# Patient Record
Sex: FEMALE | Race: WHITE | NOT HISPANIC OR LATINO
[De-identification: names, ages, dates, MRNs, and addresses within clinical notes are randomized per-mention and may not be internally consistent; named-entity substitution may affect disease eponyms.]

---

## 2017-06-22 ENCOUNTER — RX RENEWAL (OUTPATIENT)
Age: 47
End: 2017-06-22

## 2017-09-12 ENCOUNTER — LABORATORY RESULT (OUTPATIENT)
Age: 47
End: 2017-09-12

## 2017-09-12 ENCOUNTER — APPOINTMENT (OUTPATIENT)
Dept: INTERNAL MEDICINE | Facility: CLINIC | Age: 47
End: 2017-09-12
Payer: MEDICAID

## 2017-09-12 VITALS
HEIGHT: 60 IN | TEMPERATURE: 98.8 F | HEART RATE: 77 BPM | BODY MASS INDEX: 30.82 KG/M2 | SYSTOLIC BLOOD PRESSURE: 160 MMHG | DIASTOLIC BLOOD PRESSURE: 90 MMHG | WEIGHT: 157 LBS | OXYGEN SATURATION: 97 %

## 2017-09-12 DIAGNOSIS — J30.2 OTHER SEASONAL ALLERGIC RHINITIS: ICD-10-CM

## 2017-09-12 PROCEDURE — 99386 PREV VISIT NEW AGE 40-64: CPT | Mod: 25

## 2017-09-12 PROCEDURE — 36415 COLL VENOUS BLD VENIPUNCTURE: CPT

## 2017-09-17 LAB
25(OH)D3 SERPL-MCNC: 40.2 NG/ML
ALBUMIN SERPL ELPH-MCNC: 4.5 G/DL
ALP BLD-CCNC: 42 U/L
ALT SERPL-CCNC: 13 U/L
ANION GAP SERPL CALC-SCNC: 18 MMOL/L
APPEARANCE: ABNORMAL
AST SERPL-CCNC: 18 U/L
BASOPHILS # BLD AUTO: 0.05 K/UL
BASOPHILS NFR BLD AUTO: 0.8 %
BILIRUB SERPL-MCNC: 0.4 MG/DL
BILIRUBIN URINE: NEGATIVE
BLOOD URINE: NEGATIVE
BUN SERPL-MCNC: 12 MG/DL
CALCIUM SERPL-MCNC: 9.6 MG/DL
CHLORIDE SERPL-SCNC: 98 MMOL/L
CHOLEST SERPL-MCNC: 227 MG/DL
CHOLEST/HDLC SERPL: 4.9 RATIO
CO2 SERPL-SCNC: 22 MMOL/L
COLOR: ABNORMAL
CREAT SERPL-MCNC: 0.97 MG/DL
CREAT SPEC-SCNC: 275 MG/DL
EOSINOPHIL # BLD AUTO: 0.28 K/UL
EOSINOPHIL NFR BLD AUTO: 4.3 %
GLUCOSE QUALITATIVE U: NORMAL MG/DL
GLUCOSE SERPL-MCNC: 156 MG/DL
HBA1C MFR BLD HPLC: 7 %
HCT VFR BLD CALC: 39.6 %
HDLC SERPL-MCNC: 46 MG/DL
HGB BLD-MCNC: 13 G/DL
HIV1+2 AB SPEC QL IA.RAPID: NONREACTIVE
IMM GRANULOCYTES NFR BLD AUTO: 0.3 %
KETONES URINE: NEGATIVE
LDLC SERPL CALC-MCNC: 125 MG/DL
LEUKOCYTE ESTERASE URINE: NEGATIVE
LYMPHOCYTES # BLD AUTO: 1.63 K/UL
LYMPHOCYTES NFR BLD AUTO: 24.9 %
MAN DIFF?: NORMAL
MCHC RBC-ENTMCNC: 30.2 PG
MCHC RBC-ENTMCNC: 32.8 GM/DL
MCV RBC AUTO: 91.9 FL
MICROALBUMIN 24H UR DL<=1MG/L-MCNC: 4.2 MG/DL
MICROALBUMIN/CREAT 24H UR-RTO: 15 MG/G
MONOCYTES # BLD AUTO: 0.42 K/UL
MONOCYTES NFR BLD AUTO: 6.4 %
NEUTROPHILS # BLD AUTO: 4.14 K/UL
NEUTROPHILS NFR BLD AUTO: 63.3 %
NITRITE URINE: NEGATIVE
PH URINE: 5
PLATELET # BLD AUTO: 293 K/UL
POTASSIUM SERPL-SCNC: 4.8 MMOL/L
PROT SERPL-MCNC: 7.6 G/DL
PROTEIN URINE: ABNORMAL MG/DL
RBC # BLD: 4.31 M/UL
RBC # FLD: 12.7 %
SODIUM SERPL-SCNC: 138 MMOL/L
SPECIFIC GRAVITY URINE: 1.03
T4 FREE SERPL-MCNC: 1.4 NG/DL
TRIGL SERPL-MCNC: 278 MG/DL
TSH SERPL-ACNC: 4.59 UIU/ML
UROBILINOGEN URINE: NORMAL MG/DL
WBC # FLD AUTO: 6.54 K/UL

## 2017-09-26 ENCOUNTER — MEDICATION RENEWAL (OUTPATIENT)
Age: 47
End: 2017-09-26

## 2017-09-26 ENCOUNTER — TRANSCRIPTION ENCOUNTER (OUTPATIENT)
Age: 47
End: 2017-09-26

## 2017-10-18 ENCOUNTER — APPOINTMENT (OUTPATIENT)
Dept: CHRONIC DISEASE MANAGEMENT | Facility: CLINIC | Age: 47
End: 2017-10-18

## 2017-11-22 ENCOUNTER — TRANSCRIPTION ENCOUNTER (OUTPATIENT)
Age: 47
End: 2017-11-22

## 2017-11-22 ENCOUNTER — CLINICAL ADVICE (OUTPATIENT)
Age: 47
End: 2017-11-22

## 2017-11-22 DIAGNOSIS — L98.9 DISORDER OF THE SKIN AND SUBCUTANEOUS TISSUE, UNSPECIFIED: ICD-10-CM

## 2017-12-15 ENCOUNTER — CLINICAL ADVICE (OUTPATIENT)
Age: 47
End: 2017-12-15

## 2017-12-18 ENCOUNTER — TRANSCRIPTION ENCOUNTER (OUTPATIENT)
Age: 47
End: 2017-12-18

## 2018-01-05 ENCOUNTER — APPOINTMENT (OUTPATIENT)
Dept: INTERNAL MEDICINE | Facility: CLINIC | Age: 48
End: 2018-01-05
Payer: MEDICAID

## 2018-01-05 VITALS
WEIGHT: 155 LBS | OXYGEN SATURATION: 98 % | HEART RATE: 103 BPM | HEIGHT: 60 IN | DIASTOLIC BLOOD PRESSURE: 80 MMHG | TEMPERATURE: 98 F | SYSTOLIC BLOOD PRESSURE: 130 MMHG | BODY MASS INDEX: 30.43 KG/M2

## 2018-01-05 DIAGNOSIS — J06.9 ACUTE UPPER RESPIRATORY INFECTION, UNSPECIFIED: ICD-10-CM

## 2018-01-05 PROCEDURE — 99213 OFFICE O/P EST LOW 20 MIN: CPT

## 2018-01-05 RX ORDER — DOXYCYCLINE HYCLATE 100 MG/1
100 CAPSULE ORAL
Qty: 20 | Refills: 0 | Status: DISCONTINUED | COMMUNITY
Start: 2017-10-05

## 2018-01-09 LAB
BACTERIA THROAT CULT: ABNORMAL
FLUAV SPEC QL CULT: NEGATIVE
FLUBV AG SPEC QL IA: NEGATIVE
RAPID RVP RESULT: NOT DETECTED

## 2018-01-14 ENCOUNTER — TRANSCRIPTION ENCOUNTER (OUTPATIENT)
Age: 48
End: 2018-01-14

## 2018-01-16 ENCOUNTER — TRANSCRIPTION ENCOUNTER (OUTPATIENT)
Age: 48
End: 2018-01-16

## 2018-01-19 ENCOUNTER — APPOINTMENT (OUTPATIENT)
Dept: INTERNAL MEDICINE | Facility: CLINIC | Age: 48
End: 2018-01-19

## 2018-02-01 ENCOUNTER — MEDICATION RENEWAL (OUTPATIENT)
Age: 48
End: 2018-02-01

## 2018-02-02 ENCOUNTER — APPOINTMENT (OUTPATIENT)
Dept: INTERNAL MEDICINE | Facility: CLINIC | Age: 48
End: 2018-02-02
Payer: MEDICAID

## 2018-02-02 ENCOUNTER — TRANSCRIPTION ENCOUNTER (OUTPATIENT)
Age: 48
End: 2018-02-02

## 2018-02-02 PROCEDURE — G0008: CPT

## 2018-02-02 PROCEDURE — 90686 IIV4 VACC NO PRSV 0.5 ML IM: CPT

## 2018-02-14 ENCOUNTER — TRANSCRIPTION ENCOUNTER (OUTPATIENT)
Age: 48
End: 2018-02-14

## 2018-02-14 ENCOUNTER — MEDICATION RENEWAL (OUTPATIENT)
Age: 48
End: 2018-02-14

## 2018-02-15 ENCOUNTER — TRANSCRIPTION ENCOUNTER (OUTPATIENT)
Age: 48
End: 2018-02-15

## 2018-04-27 ENCOUNTER — LABORATORY RESULT (OUTPATIENT)
Age: 48
End: 2018-04-27

## 2018-04-27 ENCOUNTER — APPOINTMENT (OUTPATIENT)
Dept: INTERNAL MEDICINE | Facility: CLINIC | Age: 48
End: 2018-04-27
Payer: MEDICAID

## 2018-04-27 VITALS
HEIGHT: 60 IN | WEIGHT: 153 LBS | BODY MASS INDEX: 30.04 KG/M2 | SYSTOLIC BLOOD PRESSURE: 124 MMHG | OXYGEN SATURATION: 98 % | DIASTOLIC BLOOD PRESSURE: 72 MMHG | TEMPERATURE: 98.3 F | HEART RATE: 86 BPM

## 2018-04-27 PROCEDURE — 36415 COLL VENOUS BLD VENIPUNCTURE: CPT

## 2018-04-27 PROCEDURE — 99214 OFFICE O/P EST MOD 30 MIN: CPT | Mod: 25

## 2018-04-27 RX ORDER — AZITHROMYCIN 250 MG/1
250 TABLET, FILM COATED ORAL
Qty: 1 | Refills: 0 | Status: COMPLETED | COMMUNITY
Start: 2018-01-05 | End: 2018-04-27

## 2018-05-29 ENCOUNTER — APPOINTMENT (OUTPATIENT)
Dept: DERMATOLOGY | Facility: CLINIC | Age: 48
End: 2018-05-29

## 2018-06-11 ENCOUNTER — MEDICATION RENEWAL (OUTPATIENT)
Age: 48
End: 2018-06-11

## 2018-06-11 ENCOUNTER — TRANSCRIPTION ENCOUNTER (OUTPATIENT)
Age: 48
End: 2018-06-11

## 2018-06-12 ENCOUNTER — MEDICATION RENEWAL (OUTPATIENT)
Age: 48
End: 2018-06-12

## 2018-06-12 ENCOUNTER — TRANSCRIPTION ENCOUNTER (OUTPATIENT)
Age: 48
End: 2018-06-12

## 2018-06-12 LAB
ALBUMIN SERPL ELPH-MCNC: 4.5 G/DL
ALP BLD-CCNC: 51 U/L
ALT SERPL-CCNC: 27 U/L
ANION GAP SERPL CALC-SCNC: 16 MMOL/L
AST SERPL-CCNC: 21 U/L
BILIRUB SERPL-MCNC: 0.3 MG/DL
BUN SERPL-MCNC: 23 MG/DL
CALCIUM SERPL-MCNC: 9.6 MG/DL
CHLORIDE SERPL-SCNC: 101 MMOL/L
CHOLEST SERPL-MCNC: 247 MG/DL
CHOLEST/HDLC SERPL: 4.3 RATIO
CO2 SERPL-SCNC: 20 MMOL/L
CREAT SERPL-MCNC: 0.83 MG/DL
GLUCOSE SERPL-MCNC: 132 MG/DL
HBA1C MFR BLD HPLC: 6.6 %
HDLC SERPL-MCNC: 57 MG/DL
LDLC SERPL CALC-MCNC: 162 MG/DL
POTASSIUM SERPL-SCNC: 4.7 MMOL/L
PROT SERPL-MCNC: 7.4 G/DL
SODIUM SERPL-SCNC: 137 MMOL/L
TRIGL SERPL-MCNC: 139 MG/DL
TSH SERPL-ACNC: 0.2 UIU/ML

## 2018-06-12 RX ORDER — METFORMIN HYDROCHLORIDE 1000 MG/1
1000 TABLET, FILM COATED, EXTENDED RELEASE ORAL
Qty: 90 | Refills: 1 | Status: DISCONTINUED | COMMUNITY
Start: 2018-01-05 | End: 2018-06-12

## 2018-06-25 ENCOUNTER — TRANSCRIPTION ENCOUNTER (OUTPATIENT)
Age: 48
End: 2018-06-25

## 2018-06-26 ENCOUNTER — TRANSCRIPTION ENCOUNTER (OUTPATIENT)
Age: 48
End: 2018-06-26

## 2018-07-31 ENCOUNTER — APPOINTMENT (OUTPATIENT)
Dept: INTERNAL MEDICINE | Facility: CLINIC | Age: 48
End: 2018-07-31
Payer: MEDICAID

## 2018-07-31 VITALS
SYSTOLIC BLOOD PRESSURE: 140 MMHG | TEMPERATURE: 98.3 F | HEART RATE: 80 BPM | DIASTOLIC BLOOD PRESSURE: 70 MMHG | WEIGHT: 166 LBS | OXYGEN SATURATION: 98 % | HEIGHT: 60 IN | BODY MASS INDEX: 32.59 KG/M2

## 2018-07-31 PROCEDURE — 36415 COLL VENOUS BLD VENIPUNCTURE: CPT

## 2018-07-31 PROCEDURE — 99214 OFFICE O/P EST MOD 30 MIN: CPT | Mod: 25

## 2018-07-31 RX ORDER — LEVOTHYROXINE SODIUM 0.14 MG/1
137 TABLET ORAL DAILY
Qty: 30 | Refills: 3 | Status: COMPLETED | COMMUNITY
Start: 2017-09-26 | End: 2018-07-31

## 2018-08-01 ENCOUNTER — TRANSCRIPTION ENCOUNTER (OUTPATIENT)
Age: 48
End: 2018-08-01

## 2018-08-01 LAB
HBA1C MFR BLD HPLC: 6.8 %
T4 FREE SERPL-MCNC: 1.6 NG/DL
TSH SERPL-ACNC: 1.73 UIU/ML

## 2018-08-02 ENCOUNTER — TRANSCRIPTION ENCOUNTER (OUTPATIENT)
Age: 48
End: 2018-08-02

## 2018-08-02 NOTE — HISTORY OF PRESENT ILLNESS
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 Remainder of ROS checked and was negative

## 2018-08-02 NOTE — ASSESSMENT
[FreeTextEntry1] : 47yo F with t2dm here for follo wup\par \par T2dm - cont metformin\par \par hypothyroidism - cont levothyroxine - check levels today\par \par \par Obesity - encouraged healthy eating

## 2018-08-02 NOTE — PHYSICAL EXAM
[No Acute Distress] : no acute distress [Well Nourished] : well nourished [Well Developed] : well developed [Well-Appearing] : well-appearing [Normal Sclera/Conjunctiva] : normal sclera/conjunctiva [PERRL] : pupils equal round and reactive to light [EOMI] : extraocular movements intact [Normal Outer Ear/Nose] : the outer ears and nose were normal in appearance [Normal Oropharynx] : the oropharynx was normal [No JVD] : no jugular venous distention [Supple] : supple [No Respiratory Distress] : no respiratory distress  [Clear to Auscultation] : lungs were clear to auscultation bilaterally [No Accessory Muscle Use] : no accessory muscle use [Normal Rate] : normal rate  [Regular Rhythm] : with a regular rhythm [Normal S1, S2] : normal S1 and S2 [No Murmur] : no murmur heard [Soft] : abdomen soft [Non Tender] : non-tender [Non-distended] : non-distended [Normal Posterior Cervical Nodes] : no posterior cervical lymphadenopathy [Normal Anterior Cervical Nodes] : no anterior cervical lymphadenopathy [No CVA Tenderness] : no CVA  tenderness [No Spinal Tenderness] : no spinal tenderness [No Joint Swelling] : no joint swelling [Grossly Normal Strength/Tone] : grossly normal strength/tone [No Rash] : no rash [Normal Gait] : normal gait [Coordination Grossly Intact] : coordination grossly intact [No Focal Deficits] : no focal deficits [Deep Tendon Reflexes (DTR)] : deep tendon reflexes were 2+ and symmetric [Normal Affect] : the affect was normal [Normal Insight/Judgement] : insight and judgment were intact

## 2018-08-23 ENCOUNTER — TRANSCRIPTION ENCOUNTER (OUTPATIENT)
Age: 48
End: 2018-08-23

## 2018-08-28 ENCOUNTER — TRANSCRIPTION ENCOUNTER (OUTPATIENT)
Age: 48
End: 2018-08-28

## 2018-11-27 ENCOUNTER — APPOINTMENT (OUTPATIENT)
Dept: INTERNAL MEDICINE | Facility: CLINIC | Age: 48
End: 2018-11-27
Payer: MEDICAID

## 2018-11-27 VITALS
WEIGHT: 166 LBS | TEMPERATURE: 97.9 F | HEIGHT: 60 IN | DIASTOLIC BLOOD PRESSURE: 76 MMHG | BODY MASS INDEX: 32.59 KG/M2 | OXYGEN SATURATION: 97 % | HEART RATE: 79 BPM | SYSTOLIC BLOOD PRESSURE: 162 MMHG

## 2018-11-27 PROCEDURE — 90686 IIV4 VACC NO PRSV 0.5 ML IM: CPT

## 2018-11-27 PROCEDURE — G0008: CPT

## 2018-11-27 PROCEDURE — 99214 OFFICE O/P EST MOD 30 MIN: CPT | Mod: 25

## 2018-11-27 NOTE — PHYSICAL EXAM
[No Acute Distress] : no acute distress [Well Nourished] : well nourished [Normal Sclera/Conjunctiva] : normal sclera/conjunctiva [PERRL] : pupils equal round and reactive to light [Normal Outer Ear/Nose] : the outer ears and nose were normal in appearance [Normal Oropharynx] : the oropharynx was normal [Supple] : supple [Clear to Auscultation] : lungs were clear to auscultation bilaterally [Regular Rhythm] : with a regular rhythm [Normal S1, S2] : normal S1 and S2 [No CVA Tenderness] : no CVA  tenderness [No Spinal Tenderness] : no spinal tenderness [No Rash] : no rash

## 2018-11-30 NOTE — HISTORY OF PRESENT ILLNESS
[de-identified] : 49yo F with hx of alcohol misuse here for follow up.\par \par Nov 3rd, 2018 -got pulled over for DUI - spent an night in [facility] - car impounded. went to a court. Still with license. Now she has really cut down on etoh.\par \par has made dietary changes regarding the diabetes. taking the metformin.\par \par no cp, sob, n.v.d. Remainder of ROS reviewed and found to be negative.\par

## 2018-11-30 NOTE — ASSESSMENT
[FreeTextEntry1] : 49yo F with alcohol misuse, diabetes 2 here for followup\par \par Alcohol misuse - with recent DUI - pt has gained insight (more insight) into her drinking problem. Multiple conversation with health coaches in the past. Cont to decline naloxone or antabuse\par \par                        -will cont to reduce wine intake\par \par T2DM - cont metformin \par              blood work in 3 months\par \par \par >50% of this 25 minute visit was spent in face-to-face time counseling patient on management of alcohol misuse.

## 2019-01-02 ENCOUNTER — APPOINTMENT (OUTPATIENT)
Dept: DERMATOLOGY | Facility: CLINIC | Age: 49
End: 2019-01-02
Payer: MEDICAID

## 2019-01-02 VITALS
WEIGHT: 150 LBS | SYSTOLIC BLOOD PRESSURE: 144 MMHG | DIASTOLIC BLOOD PRESSURE: 80 MMHG | HEIGHT: 60 IN | BODY MASS INDEX: 29.45 KG/M2

## 2019-01-02 DIAGNOSIS — L82.1 OTHER SEBORRHEIC KERATOSIS: ICD-10-CM

## 2019-01-02 DIAGNOSIS — D23.9 OTHER BENIGN NEOPLASM OF SKIN, UNSPECIFIED: ICD-10-CM

## 2019-01-02 DIAGNOSIS — L70.9 ACNE, UNSPECIFIED: ICD-10-CM

## 2019-01-02 DIAGNOSIS — L81.4 OTHER MELANIN HYPERPIGMENTATION: ICD-10-CM

## 2019-01-02 PROCEDURE — 99203 OFFICE O/P NEW LOW 30 MIN: CPT

## 2019-02-12 ENCOUNTER — APPOINTMENT (OUTPATIENT)
Dept: OPHTHALMOLOGY | Facility: CLINIC | Age: 49
End: 2019-02-12

## 2019-02-19 ENCOUNTER — TRANSCRIPTION ENCOUNTER (OUTPATIENT)
Age: 49
End: 2019-02-19

## 2019-02-19 ENCOUNTER — CLINICAL ADVICE (OUTPATIENT)
Age: 49
End: 2019-02-19

## 2019-02-25 ENCOUNTER — TRANSCRIPTION ENCOUNTER (OUTPATIENT)
Age: 49
End: 2019-02-25

## 2019-02-28 ENCOUNTER — TRANSCRIPTION ENCOUNTER (OUTPATIENT)
Age: 49
End: 2019-02-28

## 2019-03-01 ENCOUNTER — MEDICATION RENEWAL (OUTPATIENT)
Age: 49
End: 2019-03-01

## 2019-03-01 ENCOUNTER — TRANSCRIPTION ENCOUNTER (OUTPATIENT)
Age: 49
End: 2019-03-01

## 2019-03-04 ENCOUNTER — TRANSCRIPTION ENCOUNTER (OUTPATIENT)
Age: 49
End: 2019-03-04

## 2019-03-04 ENCOUNTER — MEDICATION RENEWAL (OUTPATIENT)
Age: 49
End: 2019-03-04

## 2019-03-18 ENCOUNTER — TRANSCRIPTION ENCOUNTER (OUTPATIENT)
Age: 49
End: 2019-03-18

## 2019-05-18 ENCOUNTER — TRANSCRIPTION ENCOUNTER (OUTPATIENT)
Age: 49
End: 2019-05-18

## 2019-05-18 DIAGNOSIS — M25.539 PAIN IN UNSPECIFIED WRIST: ICD-10-CM

## 2019-06-11 ENCOUNTER — APPOINTMENT (OUTPATIENT)
Dept: ORTHOPEDIC SURGERY | Facility: CLINIC | Age: 49
End: 2019-06-11
Payer: MEDICAID

## 2019-06-11 VITALS — SYSTOLIC BLOOD PRESSURE: 155 MMHG | DIASTOLIC BLOOD PRESSURE: 88 MMHG | HEART RATE: 91 BPM

## 2019-06-11 PROCEDURE — 20600 DRAIN/INJ JOINT/BURSA W/O US: CPT | Mod: LT

## 2019-06-11 PROCEDURE — 73130 X-RAY EXAM OF HAND: CPT | Mod: LT

## 2019-06-11 PROCEDURE — 99203 OFFICE O/P NEW LOW 30 MIN: CPT | Mod: 25

## 2019-06-17 ENCOUNTER — OUTPATIENT (OUTPATIENT)
Dept: OUTPATIENT SERVICES | Facility: HOSPITAL | Age: 49
LOS: 1 days | End: 2019-06-17
Payer: COMMERCIAL

## 2019-06-17 DIAGNOSIS — M19.049 PRIMARY OSTEOARTHRITIS, UNSPECIFIED HAND: ICD-10-CM

## 2019-06-17 PROCEDURE — 97165 OT EVAL LOW COMPLEX 30 MIN: CPT

## 2019-06-17 PROCEDURE — 97110 THERAPEUTIC EXERCISES: CPT

## 2019-07-09 ENCOUNTER — RX RENEWAL (OUTPATIENT)
Age: 49
End: 2019-07-09

## 2019-08-06 ENCOUNTER — APPOINTMENT (OUTPATIENT)
Dept: GASTROENTEROLOGY | Facility: CLINIC | Age: 49
End: 2019-08-06
Payer: MEDICAID

## 2019-08-06 VITALS
HEIGHT: 60 IN | WEIGHT: 170 LBS | OXYGEN SATURATION: 98 % | HEART RATE: 81 BPM | DIASTOLIC BLOOD PRESSURE: 92 MMHG | SYSTOLIC BLOOD PRESSURE: 151 MMHG | BODY MASS INDEX: 33.38 KG/M2

## 2019-08-06 DIAGNOSIS — Z12.11 ENCOUNTER FOR SCREENING FOR MALIGNANT NEOPLASM OF COLON: ICD-10-CM

## 2019-08-06 DIAGNOSIS — Z12.12 ENCOUNTER FOR SCREENING FOR MALIGNANT NEOPLASM OF COLON: ICD-10-CM

## 2019-08-06 PROCEDURE — 99205 OFFICE O/P NEW HI 60 MIN: CPT

## 2019-08-06 RX ORDER — AZITHROMYCIN 250 MG/1
250 TABLET, FILM COATED ORAL
Qty: 1 | Refills: 2 | Status: COMPLETED | COMMUNITY
Start: 2019-02-19 | End: 2019-08-06

## 2019-08-06 NOTE — HISTORY OF PRESENT ILLNESS
[de-identified] : That the patient has been having increasingly problematic upper GI symptoms with reflux, coughing given a PPI by her allergist with relief.  Food sometimes feels like it sticking upon swallowing. She denies nausea vomiting. Her weight has been increasing and typically fluctuates with diet. She is drinking excessive alcohol given the high levels of stress. She is moving her bowels more early but has a history of constipation is due for screening colonoscopy.

## 2019-08-06 NOTE — ASSESSMENT
[FreeTextEntry1] : That my impression is that of reflux into patient who is overweight and currently not caring for herself well. I recommended decrease alcohol intake.I have spent a great deal of time discussing the role of daily exercise with the patient. We discussed lifestyle modification and the merits of brief, exertional efforts. I have discussed nutrition in great detail including consuming vegetable fibers on a daily basis.  We have also reviewed the benefits of soluble fiber supplementation, including (but not limited to), favorable effects on lipid profile, weight control, decreasing the risk of cardiovascular disease and the salutary effects on colonic microbiota.\par \par The patient is due for screening colonoscopy.\par \par I have asked the patient to schedule both an upper endoscopy and a colonoscopy in the near future. I have reviewed the risks benefits and alternatives and provide the patient literature to read.  I have emphasized the need to have a good clean out including adequate fluid intake and avoiding seeds for one week prior to the procedure.

## 2019-08-06 NOTE — PHYSICAL EXAM
[General Appearance - Alert] : alert [General Appearance - In No Acute Distress] : in no acute distress [PERRL With Normal Accommodation] : pupils were equal in size, round, and reactive to light [Sclera] : the sclera and conjunctiva were normal [Outer Ear] : the ears and nose were normal in appearance [Extraocular Movements] : extraocular movements were intact [Oropharynx] : the oropharynx was normal [Neck Appearance] : the appearance of the neck was normal [Neck Cervical Mass (___cm)] : no neck mass was observed [Jugular Venous Distention Increased] : there was no jugular-venous distention [Thyroid Diffuse Enlargement] : the thyroid was not enlarged [Thyroid Nodule] : there were no palpable thyroid nodules [Auscultation Breath Sounds / Voice Sounds] : lungs were clear to auscultation bilaterally [Heart Sounds] : normal S1 and S2 [Heart Rate And Rhythm] : heart rate was normal and rhythm regular [Heart Sounds Gallop] : no gallops [Murmurs] : no murmurs [Heart Sounds Pericardial Friction Rub] : no pericardial rub [Edema] : there was no peripheral edema [Bowel Sounds] : normal bowel sounds [Abdomen Soft] : soft [Abdomen Tenderness] : non-tender [Abdomen Mass (___ Cm)] : no abdominal mass palpated [Cervical Lymph Nodes Enlarged Posterior Bilaterally] : posterior cervical [Cervical Lymph Nodes Enlarged Anterior Bilaterally] : anterior cervical [No CVA Tenderness] : no ~M costovertebral angle tenderness [No Spinal Tenderness] : no spinal tenderness [Abnormal Walk] : normal gait [Nail Clubbing] : no clubbing  or cyanosis of the fingernails [Musculoskeletal - Swelling] : no joint swelling seen [Motor Tone] : muscle strength and tone were normal [Skin Color & Pigmentation] : normal skin color and pigmentation [Skin Turgor] : normal skin turgor [] : no rash [No Focal Deficits] : no focal deficits [Oriented To Time, Place, And Person] : oriented to person, place, and time [Impaired Insight] : insight and judgment were intact [Affect] : the affect was normal

## 2019-08-06 NOTE — REVIEW OF SYSTEMS
[Feeling Poorly] : feeling poorly [Anxiety] : anxiety [As Noted in HPI] : as noted in HPI [Depression] : depression [Negative] : Heme/Lymph

## 2019-08-06 NOTE — REASON FOR VISIT
Patient presents with suspected pharyngeal dysphagia characterized by mildly reduced hyolaryngeal elevation upon palpation, intermittent repeat swallows suggestive of pharyngeal retention with c/o same with chewables, and s/s of laryngeal penetration and/or aspiration with thin liquids. [Consultation] : a consultation visit

## 2019-08-27 ENCOUNTER — MEDICATION RENEWAL (OUTPATIENT)
Age: 49
End: 2019-08-27

## 2019-08-27 ENCOUNTER — TRANSCRIPTION ENCOUNTER (OUTPATIENT)
Age: 49
End: 2019-08-27

## 2019-08-29 ENCOUNTER — TRANSCRIPTION ENCOUNTER (OUTPATIENT)
Age: 49
End: 2019-08-29

## 2019-08-30 ENCOUNTER — OUTPATIENT (OUTPATIENT)
Dept: OUTPATIENT SERVICES | Facility: HOSPITAL | Age: 49
LOS: 1 days | End: 2019-08-30
Payer: COMMERCIAL

## 2019-08-30 ENCOUNTER — APPOINTMENT (OUTPATIENT)
Dept: GASTROENTEROLOGY | Facility: HOSPITAL | Age: 49
End: 2019-08-30

## 2019-08-30 ENCOUNTER — RESULT REVIEW (OUTPATIENT)
Age: 49
End: 2019-08-30

## 2019-08-30 DIAGNOSIS — K21.9 GASTRO-ESOPHAGEAL REFLUX DISEASE WITHOUT ESOPHAGITIS: ICD-10-CM

## 2019-08-30 DIAGNOSIS — Z12.11 ENCOUNTER FOR SCREENING FOR MALIGNANT NEOPLASM OF COLON: ICD-10-CM

## 2019-08-30 LAB — HCG UR QL: NEGATIVE — SIGNIFICANT CHANGE UP

## 2019-08-30 PROCEDURE — 88312 SPECIAL STAINS GROUP 1: CPT | Mod: 26

## 2019-08-30 PROCEDURE — 88312 SPECIAL STAINS GROUP 1: CPT

## 2019-08-30 PROCEDURE — 88313 SPECIAL STAINS GROUP 2: CPT | Mod: 26

## 2019-08-30 PROCEDURE — 45385 COLONOSCOPY W/LESION REMOVAL: CPT | Mod: PT

## 2019-08-30 PROCEDURE — 88313 SPECIAL STAINS GROUP 2: CPT

## 2019-08-30 PROCEDURE — 43239 EGD BIOPSY SINGLE/MULTIPLE: CPT | Mod: 59

## 2019-08-30 PROCEDURE — 81025 URINE PREGNANCY TEST: CPT

## 2019-08-30 PROCEDURE — 88305 TISSUE EXAM BY PATHOLOGIST: CPT | Mod: 26

## 2019-08-30 PROCEDURE — 88305 TISSUE EXAM BY PATHOLOGIST: CPT

## 2019-08-30 PROCEDURE — C1889: CPT

## 2019-08-30 PROCEDURE — 43239 EGD BIOPSY SINGLE/MULTIPLE: CPT

## 2019-08-30 PROCEDURE — 45385 COLONOSCOPY W/LESION REMOVAL: CPT | Mod: 33

## 2019-08-30 PROCEDURE — 82962 GLUCOSE BLOOD TEST: CPT

## 2019-09-03 LAB — SURGICAL PATHOLOGY STUDY: SIGNIFICANT CHANGE UP

## 2019-09-09 ENCOUNTER — TRANSCRIPTION ENCOUNTER (OUTPATIENT)
Age: 49
End: 2019-09-09

## 2019-09-09 ENCOUNTER — RX CHANGE (OUTPATIENT)
Age: 49
End: 2019-09-09

## 2019-09-12 ENCOUNTER — TRANSCRIPTION ENCOUNTER (OUTPATIENT)
Age: 49
End: 2019-09-12

## 2019-09-27 ENCOUNTER — APPOINTMENT (OUTPATIENT)
Dept: INTERNAL MEDICINE | Facility: CLINIC | Age: 49
End: 2019-09-27
Payer: MEDICAID

## 2019-09-27 VITALS
TEMPERATURE: 98.5 F | WEIGHT: 161 LBS | DIASTOLIC BLOOD PRESSURE: 86 MMHG | SYSTOLIC BLOOD PRESSURE: 154 MMHG | BODY MASS INDEX: 31.61 KG/M2 | HEIGHT: 60 IN | HEART RATE: 88 BPM | OXYGEN SATURATION: 98 %

## 2019-09-27 DIAGNOSIS — D21.9 BENIGN NEOPLASM OF CONNECTIVE AND OTHER SOFT TISSUE, UNSPECIFIED: ICD-10-CM

## 2019-09-27 PROCEDURE — 99214 OFFICE O/P EST MOD 30 MIN: CPT | Mod: 25

## 2019-09-27 PROCEDURE — 36415 COLL VENOUS BLD VENIPUNCTURE: CPT

## 2019-09-27 RX ORDER — TRIAMCINOLONE ACETONIDE 55 UG/1
55 SPRAY, METERED NASAL DAILY
Qty: 1 | Refills: 0 | Status: ACTIVE | COMMUNITY
Start: 2019-09-27 | End: 1900-01-01

## 2019-09-27 RX ORDER — SODIUM SULFATE, POTASSIUM SULFATE, MAGNESIUM SULFATE 17.5; 3.13; 1.6 G/ML; G/ML; G/ML
17.5-3.13-1.6 SOLUTION, CONCENTRATE ORAL
Qty: 1 | Refills: 0 | Status: COMPLETED | COMMUNITY
Start: 2019-08-06 | End: 2019-09-27

## 2019-09-27 RX ORDER — MONTELUKAST 10 MG/1
10 TABLET, FILM COATED ORAL DAILY
Qty: 90 | Refills: 1 | Status: ACTIVE | COMMUNITY
Start: 2019-09-27 | End: 1900-01-01

## 2019-09-27 RX ORDER — RANITIDINE 150 MG/1
150 TABLET ORAL
Qty: 270 | Refills: 3 | Status: COMPLETED | COMMUNITY
Start: 2019-09-09 | End: 2019-09-27

## 2019-09-30 NOTE — PHYSICAL EXAM
[EOMI] : extraocular movements intact [PERRL] : pupils equal round and reactive to light [Normal Outer Ear/Nose] : the outer ears and nose were normal in appearance [Normal Oropharynx] : the oropharynx was normal [No Lymphadenopathy] : no lymphadenopathy [No Respiratory Distress] : no respiratory distress  [No Accessory Muscle Use] : no accessory muscle use [Normal S1, S2] : normal S1 and S2 [Regular Rhythm] : with a regular rhythm [Soft] : abdomen soft [Normal Anterior Cervical Nodes] : no anterior cervical lymphadenopathy [Non Tender] : non-tender [Normal Affect] : the affect was normal [Normal Insight/Judgement] : insight and judgment were intact [de-identified] : adult F, new hair cut

## 2019-09-30 NOTE — HISTORY OF PRESENT ILLNESS
[FreeTextEntry1] : T2DM [de-identified] : 50yo F with pMH of alcohol overuse and T2DM here for follow up\par \par alcohol - cont to struggle with drinking but taking less; \par \par T2DM - takes medications daily, no side effects\par \par Remainder of ROS reviewed and found to be negative.\par

## 2019-09-30 NOTE — ASSESSMENT
[FreeTextEntry1] : 50yo F w t2dm here for follow up.\par \par T2DM cont metformin - check A1c today\par \par fibroids - follows w gyn\par \par GERD - s/p EGD\par \par Colonic polyp - s/p colo - return in 3 years\par \par >50% of this 25 minute visit was spent in face-to-face time counseling patient on management of chronic conditions.\par

## 2019-10-07 ENCOUNTER — OUTPATIENT (OUTPATIENT)
Dept: OUTPATIENT SERVICES | Facility: HOSPITAL | Age: 49
LOS: 1 days | End: 2019-10-07
Payer: COMMERCIAL

## 2019-10-07 ENCOUNTER — APPOINTMENT (OUTPATIENT)
Dept: MAMMOGRAPHY | Facility: HOSPITAL | Age: 49
End: 2019-10-07
Payer: MEDICAID

## 2019-10-07 DIAGNOSIS — Z00.8 ENCOUNTER FOR OTHER GENERAL EXAMINATION: ICD-10-CM

## 2019-10-07 PROCEDURE — 77067 SCR MAMMO BI INCL CAD: CPT | Mod: 26

## 2019-10-07 PROCEDURE — 77063 BREAST TOMOSYNTHESIS BI: CPT | Mod: 26

## 2019-10-07 PROCEDURE — 77067 SCR MAMMO BI INCL CAD: CPT

## 2019-10-07 PROCEDURE — 77063 BREAST TOMOSYNTHESIS BI: CPT

## 2019-10-09 ENCOUNTER — TRANSCRIPTION ENCOUNTER (OUTPATIENT)
Age: 49
End: 2019-10-09

## 2019-10-09 LAB
25(OH)D3 SERPL-MCNC: 42.7 NG/ML
ALBUMIN SERPL ELPH-MCNC: 4.8 G/DL
ALP BLD-CCNC: 57 U/L
ALT SERPL-CCNC: 21 U/L
ANION GAP SERPL CALC-SCNC: 14 MMOL/L
AST SERPL-CCNC: 19 U/L
BASOPHILS # BLD AUTO: 0.06 K/UL
BASOPHILS NFR BLD AUTO: 0.9 %
BILIRUB SERPL-MCNC: 0.2 MG/DL
BUN SERPL-MCNC: 15 MG/DL
CALCIUM SERPL-MCNC: 9.7 MG/DL
CHLORIDE SERPL-SCNC: 99 MMOL/L
CHOLEST SERPL-MCNC: 259 MG/DL
CHOLEST/HDLC SERPL: 5.5 RATIO
CO2 SERPL-SCNC: 23 MMOL/L
CREAT SERPL-MCNC: 0.72 MG/DL
CREAT SPEC-SCNC: 105 MG/DL
EOSINOPHIL # BLD AUTO: 0.14 K/UL
EOSINOPHIL NFR BLD AUTO: 2.2 %
ESTIMATED AVERAGE GLUCOSE: 180 MG/DL
GLUCOSE SERPL-MCNC: 120 MG/DL
HBA1C MFR BLD HPLC: 7.9 %
HCT VFR BLD CALC: 38.1 %
HDLC SERPL-MCNC: 47 MG/DL
HGB BLD-MCNC: 11.4 G/DL
IMM GRANULOCYTES NFR BLD AUTO: 0.8 %
LDLC SERPL CALC-MCNC: 163 MG/DL
LYMPHOCYTES # BLD AUTO: 1.95 K/UL
LYMPHOCYTES NFR BLD AUTO: 30 %
MAN DIFF?: NORMAL
MCHC RBC-ENTMCNC: 27.9 PG
MCHC RBC-ENTMCNC: 29.9 GM/DL
MCV RBC AUTO: 93.2 FL
MICROALBUMIN 24H UR DL<=1MG/L-MCNC: 1.2 MG/DL
MICROALBUMIN/CREAT 24H UR-RTO: 11 MG/G
MONOCYTES # BLD AUTO: 0.74 K/UL
MONOCYTES NFR BLD AUTO: 11.4 %
NEUTROPHILS # BLD AUTO: 3.57 K/UL
NEUTROPHILS NFR BLD AUTO: 54.7 %
PLATELET # BLD AUTO: 348 K/UL
POTASSIUM SERPL-SCNC: 4.8 MMOL/L
PROT SERPL-MCNC: 7.5 G/DL
RBC # BLD: 4.09 M/UL
RBC # FLD: 12.9 %
SODIUM SERPL-SCNC: 136 MMOL/L
TRIGL SERPL-MCNC: 247 MG/DL
TSH SERPL-ACNC: 1.76 UIU/ML
WBC # FLD AUTO: 6.51 K/UL

## 2019-10-11 ENCOUNTER — TRANSCRIPTION ENCOUNTER (OUTPATIENT)
Age: 49
End: 2019-10-11

## 2019-10-22 ENCOUNTER — APPOINTMENT (OUTPATIENT)
Dept: OBGYN | Facility: CLINIC | Age: 49
End: 2019-10-22
Payer: MEDICAID

## 2019-10-22 VITALS
DIASTOLIC BLOOD PRESSURE: 76 MMHG | HEIGHT: 60 IN | OXYGEN SATURATION: 98 % | HEART RATE: 83 BPM | BODY MASS INDEX: 31.02 KG/M2 | SYSTOLIC BLOOD PRESSURE: 122 MMHG | WEIGHT: 158 LBS

## 2019-10-22 DIAGNOSIS — Z80.7 FAMILY HISTORY OF OTHER MALIGNANT NEOPLASMS OF LYMPHOID, HEMATOPOIETIC AND RELATED TISSUES: ICD-10-CM

## 2019-10-22 PROCEDURE — 99386 PREV VISIT NEW AGE 40-64: CPT

## 2019-10-22 RX ORDER — MELOXICAM 15 MG/1
15 TABLET ORAL
Qty: 30 | Refills: 0 | Status: DISCONTINUED | COMMUNITY
Start: 2019-06-11 | End: 2019-10-22

## 2019-10-22 NOTE — CHIEF COMPLAINT
[FreeTextEntry1] : P0\par single\par provides  for sister's 3 kids\par [place] pt--does not take ins. H/o myomata [Annual Visit] : annual visit

## 2019-10-22 NOTE — HISTORY OF PRESENT ILLNESS
[Last Mammogram ___] : Last Mammogram was [unfilled] [Good] : being in good health [Last Colonoscopy ___] : Last colonoscopy [unfilled] [Last Pap ___] : Last cervical pap smear was [unfilled] [Perimenopausal] : is perimenopausal [Sexually Active] : is not sexually active [HPV Vaccine NA Due to Age] : HPV vaccine not available to patient due to age

## 2019-10-28 LAB
CYTOLOGY CVX/VAG DOC THIN PREP: ABNORMAL
HPV HIGH+LOW RISK DNA PNL CVX: DETECTED

## 2019-10-29 ENCOUNTER — APPOINTMENT (OUTPATIENT)
Dept: ULTRASOUND IMAGING | Facility: HOSPITAL | Age: 49
End: 2019-10-29
Payer: MEDICAID

## 2019-11-14 ENCOUNTER — MEDICATION RENEWAL (OUTPATIENT)
Age: 49
End: 2019-11-14

## 2019-11-19 ENCOUNTER — APPOINTMENT (OUTPATIENT)
Dept: ULTRASOUND IMAGING | Facility: HOSPITAL | Age: 49
End: 2019-11-19
Payer: MEDICAID

## 2019-11-19 ENCOUNTER — OUTPATIENT (OUTPATIENT)
Dept: OUTPATIENT SERVICES | Facility: HOSPITAL | Age: 49
LOS: 1 days | End: 2019-11-19
Payer: COMMERCIAL

## 2019-11-19 DIAGNOSIS — D21.9 BENIGN NEOPLASM OF CONNECTIVE AND OTHER SOFT TISSUE, UNSPECIFIED: ICD-10-CM

## 2019-11-19 PROCEDURE — 76830 TRANSVAGINAL US NON-OB: CPT

## 2019-11-19 PROCEDURE — 76830 TRANSVAGINAL US NON-OB: CPT | Mod: 26

## 2019-11-19 PROCEDURE — 76856 US EXAM PELVIC COMPLETE: CPT

## 2019-11-19 PROCEDURE — 76856 US EXAM PELVIC COMPLETE: CPT | Mod: 26

## 2019-11-26 ENCOUNTER — APPOINTMENT (OUTPATIENT)
Dept: INTERNAL MEDICINE | Facility: CLINIC | Age: 49
End: 2019-11-26

## 2019-12-11 ENCOUNTER — APPOINTMENT (OUTPATIENT)
Dept: GASTROENTEROLOGY | Facility: CLINIC | Age: 49
End: 2019-12-11

## 2020-01-08 ENCOUNTER — RX RENEWAL (OUTPATIENT)
Age: 50
End: 2020-01-08

## 2020-01-16 ENCOUNTER — APPOINTMENT (OUTPATIENT)
Dept: ORTHOPEDIC SURGERY | Facility: CLINIC | Age: 50
End: 2020-01-16
Payer: MEDICAID

## 2020-01-16 VITALS
DIASTOLIC BLOOD PRESSURE: 78 MMHG | HEIGHT: 60 IN | WEIGHT: 150 LBS | SYSTOLIC BLOOD PRESSURE: 138 MMHG | BODY MASS INDEX: 29.45 KG/M2 | HEART RATE: 73 BPM

## 2020-01-16 PROCEDURE — 99214 OFFICE O/P EST MOD 30 MIN: CPT | Mod: 25

## 2020-01-16 PROCEDURE — 20600 DRAIN/INJ JOINT/BURSA W/O US: CPT | Mod: LT

## 2020-01-28 ENCOUNTER — APPOINTMENT (OUTPATIENT)
Dept: INTERNAL MEDICINE | Facility: CLINIC | Age: 50
End: 2020-01-28
Payer: MEDICAID

## 2020-01-28 VITALS
HEIGHT: 60 IN | BODY MASS INDEX: 30.82 KG/M2 | DIASTOLIC BLOOD PRESSURE: 66 MMHG | HEART RATE: 82 BPM | SYSTOLIC BLOOD PRESSURE: 140 MMHG | TEMPERATURE: 98.4 F | WEIGHT: 157 LBS | OXYGEN SATURATION: 98 %

## 2020-01-28 PROCEDURE — 99214 OFFICE O/P EST MOD 30 MIN: CPT

## 2020-01-28 PROCEDURE — 83036 HEMOGLOBIN GLYCOSYLATED A1C: CPT | Mod: QW

## 2020-01-30 NOTE — ASSESSMENT
[FreeTextEntry1] : 50yo F here for folo up\par \par A1c is decreased but still in T2DM range - advised cont the metformin - she is not ready to come off th emedication though she wishes to \par          advised to make appt with [facility]\par \par heavy alcohol consumption - has been abstinent since Oct 2019 - pt encouraged to go one day at a time\par                     has declined naloxone in the past\par \par Hypothyroidism - cont meds

## 2020-01-30 NOTE — PHYSICAL EXAM
[EOMI] : extraocular movements intact [PERRL] : pupils equal round and reactive to light [No Respiratory Distress] : no respiratory distress  [Normal Oropharynx] : the oropharynx was normal [No Accessory Muscle Use] : no accessory muscle use [Normal Rate] : normal rate  [Regular Rhythm] : with a regular rhythm [Normal S1, S2] : normal S1 and S2 [No Rash] : no rash [Normal Affect] : the affect was normal [Normal Insight/Judgement] : insight and judgment were intact [de-identified] : appears more alert, refreshed

## 2020-01-30 NOTE — HISTORY OF PRESENT ILLNESS
[FreeTextEntry1] : T2DM\par alcohol overuse [de-identified] : 48yo F with T2DM and htn here for follow up\par \par # Lifestyle - trying to eat more veggies, was constipated; curious about A1c bc she has mde sig dietary changes\par \par # alcohol - since October 2019 - drinking less\par \par #menses -becoming erratic - last 4 -5 days - first few days are excessive-\par \par Remainder of ROS reviewed and found to be negative.\par

## 2020-02-03 LAB — HBA1C MFR BLD HPLC: 7.5

## 2020-02-04 ENCOUNTER — APPOINTMENT (OUTPATIENT)
Dept: CHRONIC DISEASE MANAGEMENT | Facility: CLINIC | Age: 50
End: 2020-02-04
Payer: MEDICAID

## 2020-02-04 VITALS — HEIGHT: 60 IN | WEIGHT: 156 LBS | BODY MASS INDEX: 30.63 KG/M2

## 2020-02-04 PROCEDURE — G0108 DIAB MANAGE TRN  PER INDIV: CPT

## 2020-02-21 ENCOUNTER — APPOINTMENT (OUTPATIENT)
Dept: ORTHOPEDIC SURGERY | Facility: CLINIC | Age: 50
End: 2020-02-21
Payer: MEDICAID

## 2020-02-21 VITALS
WEIGHT: 156 LBS | HEIGHT: 60 IN | DIASTOLIC BLOOD PRESSURE: 78 MMHG | BODY MASS INDEX: 30.63 KG/M2 | HEART RATE: 81 BPM | SYSTOLIC BLOOD PRESSURE: 129 MMHG

## 2020-02-21 DIAGNOSIS — M75.81 OTHER SHOULDER LESIONS, RIGHT SHOULDER: ICD-10-CM

## 2020-02-21 PROCEDURE — 73030 X-RAY EXAM OF SHOULDER: CPT | Mod: RT

## 2020-02-21 PROCEDURE — 99214 OFFICE O/P EST MOD 30 MIN: CPT

## 2020-02-27 ENCOUNTER — TRANSCRIPTION ENCOUNTER (OUTPATIENT)
Age: 50
End: 2020-02-27

## 2020-03-03 ENCOUNTER — APPOINTMENT (OUTPATIENT)
Dept: CHRONIC DISEASE MANAGEMENT | Facility: CLINIC | Age: 50
End: 2020-03-03

## 2020-04-07 ENCOUNTER — APPOINTMENT (OUTPATIENT)
Dept: OPHTHALMOLOGY | Facility: CLINIC | Age: 50
End: 2020-04-07

## 2020-04-18 ENCOUNTER — TRANSCRIPTION ENCOUNTER (OUTPATIENT)
Age: 50
End: 2020-04-18

## 2020-05-04 ENCOUNTER — RX RENEWAL (OUTPATIENT)
Age: 50
End: 2020-05-04

## 2020-05-26 ENCOUNTER — NON-APPOINTMENT (OUTPATIENT)
Age: 50
End: 2020-05-26

## 2020-05-27 ENCOUNTER — TRANSCRIPTION ENCOUNTER (OUTPATIENT)
Age: 50
End: 2020-05-27

## 2020-06-08 ENCOUNTER — TRANSCRIPTION ENCOUNTER (OUTPATIENT)
Age: 50
End: 2020-06-08

## 2020-06-10 ENCOUNTER — APPOINTMENT (OUTPATIENT)
Dept: INTERNAL MEDICINE | Facility: CLINIC | Age: 50
End: 2020-06-10
Payer: MEDICAID

## 2020-06-10 PROCEDURE — 99215 OFFICE O/P EST HI 40 MIN: CPT | Mod: 95

## 2020-06-14 NOTE — DATA REVIEWED
[FreeTextEntry1] : Administered AUDIT and eval with CIWA during visit\par Full AUDIT score: 25\par CIWA - 1

## 2020-06-14 NOTE — HISTORY OF PRESENT ILLNESS
[Home] : at home, [unfilled] , at the time of the visit. [Medical Office: ([facility])___] : at the medical office located in  [Verbal consent obtained from patient] : the patient, [unfilled] [FreeTextEntry1] : "I want to be heathy again"\par To lose weight, to get my sugar under control.  [de-identified] : Has struggled on and off with ETOH for a long time. "I love to drink, but only white wine  -which is bad for my sugar"..\par Reports that once she buys the bottle, its hard to stop till the bottle is empty. Had one DUI in the past - first offense but she did have to pay a substantial penalty and had an ignition interlock breathalyzer mounted on her car for some time. \par Not long ago she stopped drinking completely for 2 months - the first few days had HA, myalgias, sweats, nausea and diarrhea. The HA lasted for 2 weeks and she was cranky for a few weeks.  Then she found she felt much better - like she had turned a corner. \par \par then the stress of the pandemic started getting to her. Had increased anxiety - she started drinking a bit - "just one of the small bottles daily'.  Also recently increased fluoxetine to 60 mg daily from 40 due to the increased anxiety.  Lately she has been drinking a large bottle (~ 10 SD) daily. Feels like it is out of control, a feeling she doesn't like.  she has tried hard to cut back without success and feels she needs help at this point to be successful. \par Her Goal: full abstinence. \par reports her last drink was 3 days ago. She is not having any withdrawal sx at this time. \par \par

## 2020-06-14 NOTE — ASSESSMENT
[FreeTextEntry1] : AUD - with AUDIT score of 25 and with readiness to work towards full abstinence. \par Reviewed options in detail  - both medication and BH interventions.  Explained many people often find a combination of both medication and BH support to be beneficial.  \par She is very interested in beginning medication at this time.  She does not feel comfortable with AA - and although she does not reject peer support or individual counseling outright - would like to start medication now and possibly add other options if needed. \par \par Discussed risk v benefit of Naltrexone and how it works.  Reviewed chart - had labs done in Oct 2019 and baseline LFT's in normal range. Advised ideal to take Naltrexone ~ 2 hours before she usually begins drinking. She states that for her, the bad time is after dinner - once everything is done. She will usually sit down in front of the TV and that’s when she would drink. \par So she will take medication couple of hours prior. Begin with 1/2 pill for first 4 days - then if tolerating without complication, increase to full pill daily. \par Also discussed strategies for when she has cravings - she has several ideas. \par Short interval f/u - would like her to begin [facility] as well \par Also discussed strategies to employ when

## 2020-06-14 NOTE — PHYSICAL EXAM
[No Acute Distress] : no acute distress [Normal Sclera/Conjunctiva] : normal sclera/conjunctiva [EOMI] : extraocular movements intact [Normal Outer Ear/Nose] : the outer ears and nose were normal in appearance [No Respiratory Distress] : no respiratory distress  [No Accessory Muscle Use] : no accessory muscle use [Grossly Normal Strength/Tone] : grossly normal strength/tone [Coordination Grossly Intact] : coordination grossly intact [No Focal Deficits] : no focal deficits [Normal Affect] : the affect was normal [de-identified] : no visible tremor

## 2020-06-15 ENCOUNTER — TRANSCRIPTION ENCOUNTER (OUTPATIENT)
Age: 50
End: 2020-06-15

## 2020-06-16 ENCOUNTER — APPOINTMENT (OUTPATIENT)
Dept: INTERNAL MEDICINE | Facility: CLINIC | Age: 50
End: 2020-06-16
Payer: MEDICAID

## 2020-06-16 ENCOUNTER — TRANSCRIPTION ENCOUNTER (OUTPATIENT)
Age: 50
End: 2020-06-16

## 2020-06-16 PROCEDURE — 99443: CPT

## 2020-06-24 ENCOUNTER — APPOINTMENT (OUTPATIENT)
Dept: INTERNAL MEDICINE | Facility: CLINIC | Age: 50
End: 2020-06-24
Payer: MEDICAID

## 2020-06-24 PROCEDURE — 99214 OFFICE O/P EST MOD 30 MIN: CPT | Mod: 95

## 2020-06-25 NOTE — HISTORY OF PRESENT ILLNESS
[de-identified] : From last visit (6/16/20):\par This past Saturday, 5-6 days after d/c of all ETOH (drinking ~ 10 SD daily), and 3 days after beginning naltrexone, developed sx of nausea, abdominal discomfort/cramping, diarrhea, hand shaking/tremor, anxiety, sweats, and HA. Was severe on Saturday and Sunday, then yesterday somewhat  better and today even better than yesterday.  Initially, she was not sure if was related to the naltrexone or withdrawal so she stopped the naltrexone after 4th dose (had no problem with it the first 3 days).  In the past, she has had very similar sx which she recognized as withdrawal, however last time it started much sooner, within 1-2 days of when she stopped drinking, not 5-6. Yesterday, was still having nausea and HA but most of the other sx had resolved.  She slept a lot as she was very tired from the prior 2 days. She has not resumed drinking so last ETOH was June 7th, 9 days ago. \par During these last few days, her sister and [place] of friends were very supportive which helped a lot with her anxiety and determination. \par She also reports noticing that while she was taking the naltrexone, it seemed to have reduced her appetite and she was eating less (which she thinks is a good thing). She also reports that she has been taking Metamucil for constipation which she thinks also happened last time she stopped drinking. \par Is taking MVI but admits she forgets to take it most days. \par Her friends have been working with her the past few days on a 'Tool-box" to use to help her de-stress. \par Things like walk her dog, reach out to her friends, etc when she hits moments of distress.  Yesterday, she spent a couple of hours in the backyard brushing her dog.  He loved it, was licking her the whole time, which is good for her spirits as well. \par CIWA administered - score 6-7\par \par I/P: s/p delayed ETOH withdrawal over the week-end.  \par CIWA score today 6 - no need for medical detox at this time \par Restart Naltrexone (tolerated without complication for 3 days before withdrawal sx started)\par Call imed any side effects or new sx  \par Be sure to take multivit daily and adding thiamine\par Referred to [facility] for BH support - she also has been developing a "tool box" based on our prior visit and help from her friends - strategies to de-stress and distract at times of craving\par SBIRT Hot line info given and advised to use as needed\par f/u for [facility] visit in one week.\par \par Today: \par Ms Arboleda reports she has been doing well.  All withdrawal sx resolved within a day of our last visit. Only thing she has noticed is that she is a bit more irritable than usual. not all the time and not with everyone but she thinks her threshold for getting cranky and impatient is a bit lower than usual. Says she recently increased her fluoxetine from 40 - 60 mg daily (about 2 weeks ago) - she does not really feel the difference yet. \par Tolerating naltrexone without side effects - has been taking daily, usually takes right after dinner, at ~ 6-6:30. \par Yesterday was the first day of the full 50 mg pill.  no ill effects from going to full dose. She definitely has noticed a difference since starting naltrexone - has significantly lowered her urge to drink. She also finds she is snacking less. \par Last week-end, she was at a family gathering where everyone was drinking. Says she was able to enjoy herself, stuck to flavored seltzer, and did not drink any alcohol. Family has been very supportive as have her friends. \par RE [facility] - she has not yet checked it out. She says when she gets irritable, or thinks it is a risky time, she has been using her 'tool box' - for example, she has been walking her dog with a group of friends every night at the time she would normally sit down in front of the TV and start drinking..

## 2020-06-25 NOTE — PHYSICAL EXAM
[No Acute Distress] : no acute distress [Well-Appearing] : well-appearing [Normal Sclera/Conjunctiva] : normal sclera/conjunctiva [EOMI] : extraocular movements intact [No Respiratory Distress] : no respiratory distress  [No Accessory Muscle Use] : no accessory muscle use [No Focal Deficits] : no focal deficits [Normal Affect] : the affect was normal

## 2020-07-01 ENCOUNTER — APPOINTMENT (OUTPATIENT)
Dept: INTERNAL MEDICINE | Facility: CLINIC | Age: 50
End: 2020-07-01
Payer: MEDICAID

## 2020-07-01 PROCEDURE — 99214 OFFICE O/P EST MOD 30 MIN: CPT | Mod: 95

## 2020-07-01 NOTE — PHYSICAL EXAM
[No Acute Distress] : no acute distress [No Respiratory Distress] : no respiratory distress  [Well-Appearing] : well-appearing [Normal Sclera/Conjunctiva] : normal sclera/conjunctiva [No Accessory Muscle Use] : no accessory muscle use [No Focal Deficits] : no focal deficits [Grossly Normal Strength/Tone] : grossly normal strength/tone [Normal Affect] : the affect was normal

## 2020-07-02 NOTE — REVIEW OF SYSTEMS
[Negative] : Heme/Lymph [FreeTextEntry7] : see hpi [Suicidal] : not suicidal [de-identified] : see hpi

## 2020-07-02 NOTE — HISTORY OF PRESENT ILLNESS
[Home] : at home, [unfilled] , at the time of the visit. [Verbal consent obtained from patient] : the patient, [unfilled] [Other Location: e.g. Home (Enter Location, City,State)___] : at [unfilled] [de-identified] : Visit scheduled by patient to f/u on treatment for MDD and AUD. An in-person visit is not advisable due to the current stay-at-home directive across [facility] during the COVID-19 pandemic, so TeleHealth visit was initiated and is appropriate to care for this patient. \par \par Last ETOH intake June 7th. \par Reports she had a bad end to the week-end.  Saturday, went to a family gathering and had a very good time.  Did not drink any ETOH and since several people were driving, she was not the only one not drinking. On Sunday - fell into a funk and didn’t feel like being around anyone. Normally the family all gets together for dinner on Sunday, however she really felt that she wanted and needed to be alone.  [place] guilty but did go off by herself for the day. Monday she woke up feeling better. \par She reached out to [facility] - first session was on Monday with Kota. She found it helpful and has the next session planned. \par Of note, although she had a bad day, she did not have anything to drink. She reports she still mostly has no urge to drink.  \par Reports by the end of the day she is quite tired - is going to bed and falling asleep at 10 PM. Some days she wakes up in the morning feeling a bit like she has a hangover. she is not sure what that is about.\par Also some issues with constipation - needed to take dulcolax and then in the middle of the night, had a good BM and felt a lot better. \par Also reports

## 2020-07-07 ENCOUNTER — EMERGENCY (EMERGENCY)
Facility: HOSPITAL | Age: 50
LOS: 1 days | Discharge: HOME | End: 2020-07-07
Attending: EMERGENCY MEDICINE | Admitting: EMERGENCY MEDICINE
Payer: COMMERCIAL

## 2020-07-07 VITALS
SYSTOLIC BLOOD PRESSURE: 131 MMHG | RESPIRATION RATE: 16 BRPM | OXYGEN SATURATION: 98 % | HEART RATE: 81 BPM | WEIGHT: 156.09 LBS | DIASTOLIC BLOOD PRESSURE: 82 MMHG | TEMPERATURE: 98 F | HEIGHT: 60 IN

## 2020-07-07 VITALS
RESPIRATION RATE: 15 BRPM | HEART RATE: 84 BPM | TEMPERATURE: 98 F | SYSTOLIC BLOOD PRESSURE: 120 MMHG | DIASTOLIC BLOOD PRESSURE: 78 MMHG | OXYGEN SATURATION: 99 %

## 2020-07-07 PROCEDURE — 99283 EMERGENCY DEPT VISIT LOW MDM: CPT

## 2020-07-07 RX ORDER — DIPHENHYDRAMINE HCL 50 MG
25 CAPSULE ORAL ONCE
Refills: 0 | Status: COMPLETED | OUTPATIENT
Start: 2020-07-07 | End: 2020-07-07

## 2020-07-07 RX ADMIN — Medication 60 MILLIGRAM(S): at 02:15

## 2020-07-07 RX ADMIN — Medication 25 MILLIGRAM(S): at 02:15

## 2020-07-07 NOTE — ED PROVIDER NOTE - OBJECTIVE STATEMENT
50 y.o. F c/o diffuse hives, has known h/o seafood allergy, 50 y.o. F c/o diffuse hives, has known h/o seafood allergy, possibly ate something contaminated with seafood, hives began yesterday, but went away, was fine most of day, but now back and very itchy and uncomfortable, no respiratory complaints

## 2020-07-07 NOTE — ED PROVIDER NOTE - PATIENT PORTAL LINK FT
You can access the FollowMyHealth Patient Portal offered by [facility] by registering at the following website: [email or web address]. By joining [facility]’s FollowMyHealth portal, you will also be able to view your health information using other applications (apps) compatible with our system.

## 2020-07-07 NOTE — ED ADULT TRIAGE NOTE - CHIEF COMPLAINT QUOTE
I think im having an allergic reaction, this rash was noted yesterday. The rash is all over my body and legs

## 2020-07-07 NOTE — ED PROVIDER NOTE - CLINICAL SUMMARY MEDICAL DECISION MAKING FREE TEXT BOX
hives - intermittent and worsening, no respiratory/airway component, increase benedryl dose, add steroids

## 2020-07-07 NOTE — ED ADULT NURSE NOTE - OBJECTIVE STATEMENT
49yo female walked into ED, pt c/o "I think Im having an allergic reaction" as per pt. pt noted with redness and minor inflammation to torso, bilat arms and legs. pt denies SOB, throat swelling

## 2020-07-15 ENCOUNTER — APPOINTMENT (OUTPATIENT)
Dept: INTERNAL MEDICINE | Facility: CLINIC | Age: 50
End: 2020-07-15
Payer: MEDICAID

## 2020-07-15 PROCEDURE — 99214 OFFICE O/P EST MOD 30 MIN: CPT | Mod: 95

## 2020-07-16 ENCOUNTER — RX RENEWAL (OUTPATIENT)
Age: 50
End: 2020-07-16

## 2020-07-16 NOTE — HISTORY OF PRESENT ILLNESS
[Home] : at home, [unfilled] , at the time of the visit. [Medical Office: ([facility])___] : at the medical office located in  [Verbal consent obtained from patient] : the patient, [unfilled] [de-identified] : Visit scheduled by patient to f/u on recent Allergist visit and tx for AUD. An in-person visit is not advisable due to the current stay-at-home directive across [facility] during the COVID-19 pandemic, so TeleHealth visit was initiated and is appropriate to care for this patient.\par Engaged in tx for AUD - doing well - last ETOH June 7th\par On Naltrexone since ~ June 10th. Was doing very well on it with significant reduction in craving.  Then on 7/6, developed hives after family dinner - got better with Benadryl but worse the next evening after dinner and dose of naltrexone. \par Seen in ER, started on steroids and naltrexone has been on hold since.  S/p visit with AI early this week. (Reached out and spoke to her allergist prior to the visit to let him know she was coming and what my concerns were). \par Had visit with Dr Hughes on Monday.  Reviewed hx and by Ms Baer report, he did not think was related to food. Blood work was done, results pending, she was told by Friday. She has finished steroids, skin completely back to normal. \par AUD: Ms Arboleda reports she really feels the difference being off Naltrexone. She has not had anything to drink since stopping Naltrexone but it definitely has been more challenging.  Continues to work with [employer] - having one on one sessions with a HC which she finds helpful. Has a change plan.

## 2020-07-16 NOTE — PLAN
[FreeTextEntry1] : Options discussed - including rechallenge with Naltrexone and if she is unable to tolerate, then altnative medications including Acamprosate or Topiramate. Risk, benefit, ease of use, potential side effects reviewed.    Await recommendations by Allergist - she is game to try Naltrexone again but waiting to see what her allergist advises.

## 2020-07-16 NOTE — ASSESSMENT
[FreeTextEntry1] : Engaged in tx for AUD - doing well - last ETOH June 7th\par On Naltrexone since ~ June 10th. Was doing very well on it with significant reduction in craving.  Then on 7/6, developed hives after family dinner - got better with Benadryl but worse the next evening after dinner and dose of naltrexone. \par Seen in ER, started on steroids and naltrexone has been on hold since.  S/p visit with AI early this week. (Reached out and spoke to her allergist prior to the visit to let him know she was coming and what my concerns were). \par Had visit with Dr Hughes on Monday.  Reviewed hx and by Ms Baer report, he did not think was related to food. Blood work was done, results pending, she was told by Friday. She has finished steroids, skin completely back to normal. \par AUD: Ms Arboleda reports she really feels the difference being off Naltrexone. She has not had anything to drink since stopping Naltrexone but it definitely has been more challenging.  Continues to work with [employer] - having one on one sessions with a HC which she finds helpful. Has a change plan.

## 2020-07-16 NOTE — PHYSICAL EXAM
[No Acute Distress] : no acute distress [Well-Appearing] : well-appearing [Normal Sclera/Conjunctiva] : normal sclera/conjunctiva [No Respiratory Distress] : no respiratory distress  [EOMI] : extraocular movements intact [No Rash] : no rash

## 2020-07-29 ENCOUNTER — TRANSCRIPTION ENCOUNTER (OUTPATIENT)
Age: 50
End: 2020-07-29

## 2020-07-30 ENCOUNTER — APPOINTMENT (OUTPATIENT)
Dept: ORTHOPEDIC SURGERY | Facility: CLINIC | Age: 50
End: 2020-07-30
Payer: MEDICAID

## 2020-07-30 ENCOUNTER — TRANSCRIPTION ENCOUNTER (OUTPATIENT)
Age: 50
End: 2020-07-30

## 2020-07-30 VITALS — HEART RATE: 79 BPM | SYSTOLIC BLOOD PRESSURE: 118 MMHG | DIASTOLIC BLOOD PRESSURE: 72 MMHG

## 2020-07-30 VITALS — HEIGHT: 60 IN | WEIGHT: 145 LBS | BODY MASS INDEX: 28.47 KG/M2

## 2020-07-30 VITALS — TEMPERATURE: 98 F

## 2020-07-30 DIAGNOSIS — M19.049 PRIMARY OSTEOARTHRITIS, UNSPECIFIED HAND: ICD-10-CM

## 2020-07-30 PROCEDURE — 99214 OFFICE O/P EST MOD 30 MIN: CPT | Mod: 25

## 2020-07-30 PROCEDURE — 20600 DRAIN/INJ JOINT/BURSA W/O US: CPT | Mod: FA

## 2020-08-02 ENCOUNTER — RX RENEWAL (OUTPATIENT)
Age: 50
End: 2020-08-02

## 2020-08-04 ENCOUNTER — RX RENEWAL (OUTPATIENT)
Age: 50
End: 2020-08-04

## 2020-08-05 ENCOUNTER — APPOINTMENT (OUTPATIENT)
Dept: INTERNAL MEDICINE | Facility: CLINIC | Age: 50
End: 2020-08-05
Payer: MEDICAID

## 2020-08-05 DIAGNOSIS — L50.9 URTICARIA, UNSPECIFIED: ICD-10-CM

## 2020-08-05 PROCEDURE — 99214 OFFICE O/P EST MOD 30 MIN: CPT | Mod: 95

## 2020-08-07 PROBLEM — L50.9 URTICARIAL RASH: Status: ACTIVE | Noted: 2020-07-16

## 2020-08-07 NOTE — HISTORY OF PRESENT ILLNESS
[Medical Office: ([facility])___] : at the medical office located in  [Home] : at home, [unfilled] , at the time of the visit. [Verbal consent obtained from patient] : the patient, [unfilled] [de-identified] : Transiently off naltrexone due to concern it may have causes allergic rxn, s/p w/u - subsequently determined by AI that rxn was not related to the medication. The time off was challenging for her, although she did manage, but she is relieved to be back on it as it really helps with the craving.   Has been on a full dose daily since Saturday - so she has been back on it for about 10 days without any problem. \par Reports that she went to urgent care center. Was gardening and got the thorn in her finger over the weekend - Got infected after a couple of days. Was started on antibiotics at the urgent care center and her finger is much better.\par \par Still doing [facility] - had last session on Monday with Kota.  She said bottlecap was really helpful and the therapist was wonderful.  She did not expect it to be as helpful as it was. they were able to explore many issues in a useful therapeutic way, she says\par

## 2020-08-07 NOTE — ASSESSMENT
[FreeTextEntry1] : Back on naltrexone with no side effects - no recurrent rash or sx\carmen Had last Bottlrecap session this week - with Kota - she found it incredibly helpful - she was skeptical but found the experience of talking to someone very helpful and therapeutic and is open to continuing the process\carmen Discussed duration of naltrexone tx - explained no contraindication to long term tx - advised we will tailor to her needs but min of one yr advised.\carmen f/u 3 weeks

## 2020-08-07 NOTE — PHYSICAL EXAM
[No Acute Distress] : no acute distress [Well-Appearing] : well-appearing [Normal Sclera/Conjunctiva] : normal sclera/conjunctiva [EOMI] : extraocular movements intact [No Accessory Muscle Use] : no accessory muscle use [No Respiratory Distress] : no respiratory distress  [Grossly Normal Strength/Tone] : grossly normal strength/tone [No Rash] : no rash [Normal Affect] : the affect was normal [Coordination Grossly Intact] : coordination grossly intact

## 2020-08-11 ENCOUNTER — APPOINTMENT (OUTPATIENT)
Dept: INTERNAL MEDICINE | Facility: CLINIC | Age: 50
End: 2020-08-11
Payer: MEDICAID

## 2020-08-11 VITALS
BODY MASS INDEX: 30.04 KG/M2 | DIASTOLIC BLOOD PRESSURE: 70 MMHG | OXYGEN SATURATION: 98 % | HEART RATE: 81 BPM | HEIGHT: 60 IN | WEIGHT: 153 LBS | SYSTOLIC BLOOD PRESSURE: 124 MMHG | TEMPERATURE: 98.1 F

## 2020-08-11 DIAGNOSIS — Z23 ENCOUNTER FOR IMMUNIZATION: ICD-10-CM

## 2020-08-11 PROCEDURE — 90471 IMMUNIZATION ADMIN: CPT

## 2020-08-11 PROCEDURE — 99396 PREV VISIT EST AGE 40-64: CPT | Mod: 25

## 2020-08-11 PROCEDURE — 90750 HZV VACC RECOMBINANT IM: CPT

## 2020-08-11 NOTE — PHYSICAL EXAM
[de-identified] : Gen: Adult F, NAD\par Head: NC/AT\par EENT: ears grossly normal, PERRL, EOMI, moist mucosa\par Neck: supple\par Chest wall: nontender\par CV: normal s1 +s2, rrr, no murmurs\par Pulm: CTA-B\par Abd: soft, NT, ND\par Skin: no rashes\par Back: no CVA tenderness, no spinal tenderness\par Neuro: gait normal, AAOx3\par Psych: normal affect, normal interaction\par

## 2020-08-11 NOTE — ASSESSMENT
[FreeTextEntry1] : 51yo F devin for cpe\par \par \par hcm \par cpe today\par had colo already -- due at 53/54 -- not in snapshot but in chart\par mammo urd\par pap utd\par Labs today -- f/u A1c\par  zoster first dose today\par ophtho milli be needed for T2DM f/u

## 2020-08-11 NOTE — HISTORY OF PRESENT ILLNESS
[FreeTextEntry1] : cpe [de-identified] : 49yo F with hx of alcohol overuse here for cpe\par on naltrexone; follow with Dr. Lopez\par \par had a rash -- saw allergy - thought it was the naltrexone but it unclear the etiology\par \par Remainder of ROS reviewed and found to be negative.\par

## 2020-08-25 LAB
25(OH)D3 SERPL-MCNC: 32.7 NG/ML
ALBUMIN SERPL ELPH-MCNC: 4.8 G/DL
ALP BLD-CCNC: 53 U/L
ALT SERPL-CCNC: 20 U/L
ANION GAP SERPL CALC-SCNC: 15 MMOL/L
AST SERPL-CCNC: 18 U/L
BASOPHILS # BLD AUTO: 0.06 K/UL
BASOPHILS NFR BLD AUTO: 0.7 %
BILIRUB SERPL-MCNC: 0.3 MG/DL
BUN SERPL-MCNC: 13 MG/DL
CALCIUM SERPL-MCNC: 9.5 MG/DL
CHLORIDE SERPL-SCNC: 101 MMOL/L
CHOLEST SERPL-MCNC: 230 MG/DL
CHOLEST/HDLC SERPL: 4.6 RATIO
CO2 SERPL-SCNC: 23 MMOL/L
CREAT SERPL-MCNC: 0.78 MG/DL
EOSINOPHIL # BLD AUTO: 0.12 K/UL
EOSINOPHIL NFR BLD AUTO: 1.4 %
ESTIMATED AVERAGE GLUCOSE: 169 MG/DL
GLUCOSE SERPL-MCNC: 94 MG/DL
HBA1C MFR BLD HPLC: 7.5 %
HCT VFR BLD CALC: 35.7 %
HDLC SERPL-MCNC: 50 MG/DL
HGB BLD-MCNC: 10.7 G/DL
IMM GRANULOCYTES NFR BLD AUTO: 0.2 %
LDLC SERPL CALC-MCNC: 144 MG/DL
LYMPHOCYTES # BLD AUTO: 2.23 K/UL
LYMPHOCYTES NFR BLD AUTO: 25.7 %
MAN DIFF?: NORMAL
MCHC RBC-ENTMCNC: 24.7 PG
MCHC RBC-ENTMCNC: 30 GM/DL
MCV RBC AUTO: 82.4 FL
MONOCYTES # BLD AUTO: 0.78 K/UL
MONOCYTES NFR BLD AUTO: 9 %
NEUTROPHILS # BLD AUTO: 5.47 K/UL
NEUTROPHILS NFR BLD AUTO: 63 %
PLATELET # BLD AUTO: 404 K/UL
POTASSIUM SERPL-SCNC: 4.3 MMOL/L
PROT SERPL-MCNC: 7.1 G/DL
RBC # BLD: 4.33 M/UL
RBC # FLD: 16.5 %
SODIUM SERPL-SCNC: 138 MMOL/L
T4 FREE SERPL-MCNC: 1.6 NG/DL
TRIGL SERPL-MCNC: 180 MG/DL
TSH SERPL-ACNC: 1.17 UIU/ML
WBC # FLD AUTO: 8.68 K/UL

## 2020-08-26 ENCOUNTER — APPOINTMENT (OUTPATIENT)
Dept: INTERNAL MEDICINE | Facility: CLINIC | Age: 50
End: 2020-08-26
Payer: MEDICAID

## 2020-08-26 PROCEDURE — 99213 OFFICE O/P EST LOW 20 MIN: CPT | Mod: 95

## 2020-08-26 NOTE — PHYSICAL EXAM
[No Acute Distress] : no acute distress [No Respiratory Distress] : no respiratory distress  [EOMI] : extraocular movements intact [Well-Appearing] : well-appearing [Normal Sclera/Conjunctiva] : normal sclera/conjunctiva [Grossly Normal Strength/Tone] : grossly normal strength/tone [No Accessory Muscle Use] : no accessory muscle use [Normal Affect] : the affect was normal [No Rash] : no rash [Coordination Grossly Intact] : coordination grossly intact

## 2020-08-27 ENCOUNTER — TRANSCRIPTION ENCOUNTER (OUTPATIENT)
Age: 50
End: 2020-08-27

## 2020-08-27 NOTE — ASSESSMENT
[FreeTextEntry1] : Doing well - taking Naltrexone with dinner nightly\par still ETOH free although some days still a struggle.\par continue same dose/timing. \par Recent CMP with normal LFT's\par she does not currently feel the need to have weekly sessions with therapist as she did during [facility], but did say she thinks it would be very helpful for her to have a peer support\par will try to arrange through [facility]\par f/u in 4 weeks.

## 2020-08-27 NOTE — HISTORY OF PRESENT ILLNESS
[Home] : at home, [unfilled] , at the time of the visit. [Medical Office: ([facility])___] : at the medical office located in  [Verbal consent obtained from patient] : the patient, [unfilled] [de-identified] : Had CPE with her PCP - doing well\carmen Has maintained abstinence since June 7th. She reports it is still sometimes a struggle, but not as bad as initially. Now it is just some days and not every day.  Especially challenging is the fact that most of her family and friends engage in drinking as part of socialization (and some friends quite heavily).  They are also, however, quite supportive of her choice, and try to be mindful and not undermine her efforts to stay abstinent.  \carmen Has been taking naltrexone daily right after dinner, and finds this timing works best for her.  She is not having any side effects. \carmen Finished with [facility]le[facility] - found it really helpful. \carmen Is also happy about the fact that she has lost 15 lbs - Naltrexone has helped a little to curb her appetite. And she is trying to exercise regularly. \carmen \carmen

## 2020-09-01 ENCOUNTER — APPOINTMENT (OUTPATIENT)
Dept: DERMATOLOGY | Facility: CLINIC | Age: 50
End: 2020-09-01
Payer: MEDICAID

## 2020-09-01 VITALS — BODY MASS INDEX: 29.45 KG/M2 | HEIGHT: 60 IN | WEIGHT: 150 LBS

## 2020-09-01 DIAGNOSIS — Z87.2 PERSONAL HISTORY OF DISEASES OF THE SKIN AND SUBCUTANEOUS TISSUE: ICD-10-CM

## 2020-09-01 DIAGNOSIS — L82.0 INFLAMED SEBORRHEIC KERATOSIS: ICD-10-CM

## 2020-09-01 DIAGNOSIS — D18.01 HEMANGIOMA OF SKIN AND SUBCUTANEOUS TISSUE: ICD-10-CM

## 2020-09-01 DIAGNOSIS — D22.9 MELANOCYTIC NEVI, UNSPECIFIED: ICD-10-CM

## 2020-09-01 DIAGNOSIS — L71.9 ROSACEA, UNSPECIFIED: ICD-10-CM

## 2020-09-01 DIAGNOSIS — Z80.8 FAMILY HISTORY OF MALIGNANT NEOPLASM OF OTHER ORGANS OR SYSTEMS: ICD-10-CM

## 2020-09-01 DIAGNOSIS — L70.0 OTHER SKIN CHANGES DUE TO CHRONIC EXPOSURE TO NONIONIZING RADIATION: ICD-10-CM

## 2020-09-01 DIAGNOSIS — L73.9 FOLLICULAR DISORDER, UNSPECIFIED: ICD-10-CM

## 2020-09-01 DIAGNOSIS — Z12.83 ENCOUNTER FOR SCREENING FOR MALIGNANT NEOPLASM OF SKIN: ICD-10-CM

## 2020-09-01 DIAGNOSIS — L57.8 OTHER SKIN CHANGES DUE TO CHRONIC EXPOSURE TO NONIONIZING RADIATION: ICD-10-CM

## 2020-09-01 DIAGNOSIS — Z80.9 FAMILY HISTORY OF MALIGNANT NEOPLASM, UNSPECIFIED: ICD-10-CM

## 2020-09-01 DIAGNOSIS — Z86.018 PERSONAL HISTORY OF OTHER BENIGN NEOPLASM: ICD-10-CM

## 2020-09-01 PROCEDURE — 99213 OFFICE O/P EST LOW 20 MIN: CPT | Mod: 25

## 2020-09-01 PROCEDURE — 17110 DESTRUCTION B9 LES UP TO 14: CPT

## 2020-09-15 ENCOUNTER — NON-APPOINTMENT (OUTPATIENT)
Age: 50
End: 2020-09-15

## 2020-09-15 ENCOUNTER — APPOINTMENT (OUTPATIENT)
Dept: OPHTHALMOLOGY | Facility: CLINIC | Age: 50
End: 2020-09-15
Payer: MEDICAID

## 2020-09-15 PROCEDURE — 92004 COMPRE OPH EXAM NEW PT 1/>: CPT

## 2020-09-15 PROCEDURE — 92133 CPTRZD OPH DX IMG PST SGM ON: CPT

## 2020-09-17 ENCOUNTER — TRANSCRIPTION ENCOUNTER (OUTPATIENT)
Age: 50
End: 2020-09-17

## 2020-09-19 ENCOUNTER — TRANSCRIPTION ENCOUNTER (OUTPATIENT)
Age: 50
End: 2020-09-19

## 2020-09-23 ENCOUNTER — APPOINTMENT (OUTPATIENT)
Dept: INTERNAL MEDICINE | Facility: CLINIC | Age: 50
End: 2020-09-23
Payer: MEDICAID

## 2020-09-23 PROCEDURE — 99214 OFFICE O/P EST MOD 30 MIN: CPT | Mod: 95

## 2020-09-23 NOTE — PHYSICAL EXAM
[No Acute Distress] : no acute distress [Well-Appearing] : well-appearing [Normal Sclera/Conjunctiva] : normal sclera/conjunctiva [EOMI] : extraocular movements intact [Normal Outer Ear/Nose] : the outer ears and nose were normal in appearance [No Respiratory Distress] : no respiratory distress  [Clear to Auscultation] : lungs were clear to auscultation bilaterally [Grossly Normal Strength/Tone] : grossly normal strength/tone [No Rash] : no rash [No Focal Deficits] : no focal deficits [Normal Affect] : the affect was normal

## 2020-09-23 NOTE — HISTORY OF PRESENT ILLNESS
[Home] : at home, [unfilled] , at the time of the visit. [Verbal consent obtained from patient] : the patient, [unfilled] [Medical Office: ([facility])___] : at the medical office located in  [de-identified] : Essentially doing well.\par Taking naltrexone daily with no side effects.\par No drinking episodes since last visit.\par \par Recently discussed her interest in behavioral health support to augment medication management.  She tried calling [facility] - reports it took several days for them to call back - she was initially put off by all the care being virtual so did not yet set up an intake. \par She called Substance Abuse Hotline - they gave her 4 organizations to contact.  Has also reached out to [facility] however they also were providing virtual care. \par \par Also reports she had a slight panic attack/meltdown when her children's school closed due to COVID infection among the students - and she thought all the classes might become virtual again. Reports she panicked - was very tempted to drink - however really thought it through and was able to move on.  Kids back in school.   \par \par \par

## 2020-10-05 RX ORDER — FLUOXETINE HYDROCHLORIDE 20 MG/1
20 TABLET ORAL
Qty: 90 | Refills: 1 | Status: ACTIVE | COMMUNITY
Start: 2020-05-27 | End: 1900-01-01

## 2020-10-07 ENCOUNTER — APPOINTMENT (OUTPATIENT)
Dept: INTERNAL MEDICINE | Facility: CLINIC | Age: 50
End: 2020-10-07
Payer: MEDICAID

## 2020-10-07 PROCEDURE — 99214 OFFICE O/P EST MOD 30 MIN: CPT | Mod: 95

## 2020-10-07 NOTE — HISTORY OF PRESENT ILLNESS
[Home] : at home, [unfilled] , at the time of the visit. [Medical Office: ([facility])___] : at the medical office located in  [Verbal consent obtained from patient] : the patient, [unfilled] [FreeTextEntry1] : Here to f/u TRAVIS, currently in remission and medication management [de-identified] : Last ETOH use: June 7th\par Taking naltrexone daily along with it B and folate - no side effects. \par Last visit, discussed  support and referred to [facility].  She was still having episodes where she felt additional support would be really useful.  She does have a good support system with her family, and is able to keep very busy which also helps, but would like to develop additional skills to use at difficult times.  Finds the two times she feels the urge to drink are:\par - at parties, where there is a great deal of drinking\par - when very stressed, like when her children were sent home from school due to COVID and she thought they would need to be home schooled again for months\par \par She reports she has appt. scheduled for tomorrow at [facility] with Endy for intake.  After that visit, within a few days, they will create a plan for  support.\par Reports she briefly also reached out to [facility] however found that they are essentially a 12 step program.  She is not comfortable with that model so plans to pursue care at [facility].\par \par of note - she has been dieting since she stopped drinking and has lost 25 lbs. Doing extremely well

## 2020-10-09 ENCOUNTER — RX RENEWAL (OUTPATIENT)
Age: 50
End: 2020-10-09

## 2020-10-09 ENCOUNTER — TRANSCRIPTION ENCOUNTER (OUTPATIENT)
Age: 50
End: 2020-10-09

## 2020-10-12 ENCOUNTER — TRANSCRIPTION ENCOUNTER (OUTPATIENT)
Age: 50
End: 2020-10-12

## 2020-10-14 ENCOUNTER — TRANSCRIPTION ENCOUNTER (OUTPATIENT)
Age: 50
End: 2020-10-14

## 2020-10-15 ENCOUNTER — TRANSCRIPTION ENCOUNTER (OUTPATIENT)
Age: 50
End: 2020-10-15

## 2020-11-02 ENCOUNTER — NON-APPOINTMENT (OUTPATIENT)
Age: 50
End: 2020-11-02

## 2020-11-04 ENCOUNTER — APPOINTMENT (OUTPATIENT)
Dept: INTERNAL MEDICINE | Facility: CLINIC | Age: 50
End: 2020-11-04

## 2020-11-05 ENCOUNTER — APPOINTMENT (OUTPATIENT)
Dept: ORTHOPEDIC SURGERY | Facility: CLINIC | Age: 50
End: 2020-11-05

## 2020-11-05 ENCOUNTER — TRANSCRIPTION ENCOUNTER (OUTPATIENT)
Age: 50
End: 2020-11-05

## 2020-11-09 ENCOUNTER — TRANSCRIPTION ENCOUNTER (OUTPATIENT)
Age: 50
End: 2020-11-09

## 2020-11-10 ENCOUNTER — TRANSCRIPTION ENCOUNTER (OUTPATIENT)
Age: 50
End: 2020-11-10

## 2020-11-11 ENCOUNTER — APPOINTMENT (OUTPATIENT)
Dept: INTERNAL MEDICINE | Facility: CLINIC | Age: 50
End: 2020-11-11

## 2020-11-11 ENCOUNTER — APPOINTMENT (OUTPATIENT)
Dept: INTERNAL MEDICINE | Facility: CLINIC | Age: 50
End: 2020-11-11
Payer: MEDICAID

## 2020-11-11 DIAGNOSIS — R11.0 NAUSEA: ICD-10-CM

## 2020-11-11 PROCEDURE — 99214 OFFICE O/P EST MOD 30 MIN: CPT | Mod: 95

## 2020-11-11 NOTE — ASSESSMENT
[FreeTextEntry1] : f/u in office in one week\par due for labs - monitor on naltrexone  \par due for EKG - monitor QTc on Prozac

## 2020-11-11 NOTE — HISTORY OF PRESENT ILLNESS
[Home] : at home, [unfilled] , at the time of the visit. [Medical Office: ([facility])___] : at the medical office located in  [de-identified] : Last visit ~ 1 month ago.  Taking Naltrexone daily - tolerating well. Last ETOH use June 7th.  Finished Bottle Cap sessions with Health  and now is participating in group support sessions at [facility] 3 time a week.  \par Reports last few days she has been getting an empty/nauseous feeling in her stomach. Relieved with eating.  No emesis, no diarrhea, no abdominal pain.  \par Reminds her of episodes of hypoglycemia she used to get in her childhood.  \par Current wt 150 lbs.  - down from 157.\par Of note, wakes up and takes her medications with her first cup of coffee. Does not generally eat until lunchtime. She has always done this. \par RE drinking, has maintained abstinence since she stopped in June. Lately, has been having dreams she characterizes as nightmares about drinking. Dreams she is tempted to have "just one drink" - then in the dream she tells herself she wont be able to stop at one.  Tells herself having the drink is not going to solve anything. Wakes up afraid that she gave in - then realizes it was a dream.

## 2020-11-11 NOTE — PHYSICAL EXAM
[No Acute Distress] : no acute distress [Well-Appearing] : well-appearing [EOMI] : extraocular movements intact [No Respiratory Distress] : no respiratory distress  [No Accessory Muscle Use] : no accessory muscle use [No Rash] : no rash [Coordination Grossly Intact] : coordination grossly intact [Normal Affect] : the affect was normal

## 2020-11-12 ENCOUNTER — RX RENEWAL (OUTPATIENT)
Age: 50
End: 2020-11-12

## 2020-11-15 ENCOUNTER — TRANSCRIPTION ENCOUNTER (OUTPATIENT)
Age: 50
End: 2020-11-15

## 2020-11-16 ENCOUNTER — APPOINTMENT (OUTPATIENT)
Dept: OPHTHALMOLOGY | Facility: CLINIC | Age: 50
End: 2020-11-16
Payer: MEDICAID

## 2020-11-16 ENCOUNTER — TRANSCRIPTION ENCOUNTER (OUTPATIENT)
Age: 50
End: 2020-11-16

## 2020-11-16 ENCOUNTER — NON-APPOINTMENT (OUTPATIENT)
Age: 50
End: 2020-11-16

## 2020-11-16 PROCEDURE — 92012 INTRM OPH EXAM EST PATIENT: CPT

## 2020-11-16 PROCEDURE — 92083 EXTENDED VISUAL FIELD XM: CPT

## 2020-11-16 PROCEDURE — 99072 ADDL SUPL MATRL&STAF TM PHE: CPT

## 2020-11-16 PROCEDURE — 76514 ECHO EXAM OF EYE THICKNESS: CPT

## 2020-11-18 ENCOUNTER — NON-APPOINTMENT (OUTPATIENT)
Age: 50
End: 2020-11-18

## 2020-11-18 ENCOUNTER — APPOINTMENT (OUTPATIENT)
Dept: INTERNAL MEDICINE | Facility: CLINIC | Age: 50
End: 2020-11-18
Payer: MEDICAID

## 2020-11-18 VITALS
HEIGHT: 60 IN | OXYGEN SATURATION: 98 % | DIASTOLIC BLOOD PRESSURE: 70 MMHG | HEART RATE: 75 BPM | SYSTOLIC BLOOD PRESSURE: 140 MMHG | BODY MASS INDEX: 30.63 KG/M2 | WEIGHT: 156 LBS

## 2020-11-18 VITALS — DIASTOLIC BLOOD PRESSURE: 70 MMHG | SYSTOLIC BLOOD PRESSURE: 126 MMHG

## 2020-11-18 DIAGNOSIS — K21.9 GASTRO-ESOPHAGEAL REFLUX DISEASE W/OUT ESOPHAGITIS: ICD-10-CM

## 2020-11-18 PROCEDURE — G0008: CPT

## 2020-11-18 PROCEDURE — 90686 IIV4 VACC NO PRSV 0.5 ML IM: CPT

## 2020-11-18 PROCEDURE — 93000 ELECTROCARDIOGRAM COMPLETE: CPT

## 2020-11-18 PROCEDURE — 99214 OFFICE O/P EST MOD 30 MIN: CPT | Mod: 25

## 2020-11-18 NOTE — ASSESSMENT
[FreeTextEntry1] : On Naltrexone for AUD - doing well, last ETOH in June 2020.  \par In office visit scheduled as due for labs to monitor naltrexone and EKG to monitor QTc on Prozac.\par \par Was having episodes of nausea and a shaky feeling episodically which improved with eating. Doubt related to Naltrexone, suspect related more to dyspepsia from taking multiple pills on an empty stomach with coffee and then not eating and fluctuations in blood sugar from carb heavy meals. \par Reports she has started to eat some breakfast in the morning before taking her pills and GI sx have improved. Also advised by acupuncturist to add some protein or fat to her first meal which will help stabilized blood glucose levels.\par \par EKG with normal QTc interval. \par \par Checking LFT's and A1c

## 2020-11-18 NOTE — PHYSICAL EXAM
[No Acute Distress] : no acute distress [Well-Appearing] : well-appearing [Normal Sclera/Conjunctiva] : normal sclera/conjunctiva [No Respiratory Distress] : no respiratory distress  [Clear to Auscultation] : lungs were clear to auscultation bilaterally [Normal Rate] : normal rate  [Regular Rhythm] : with a regular rhythm [Soft] : abdomen soft [Non Tender] : non-tender [No HSM] : no HSM [Normal Bowel Sounds] : normal bowel sounds [Grossly Normal Strength/Tone] : grossly normal strength/tone [No Rash] : no rash

## 2020-11-18 NOTE — HISTORY OF PRESENT ILLNESS
[FreeTextEntry1] : medication monitoring [de-identified] : On Naltrexone for AUD - doing well, last ETOH in June 2020.  \par In office visit scheduled as due for labs to monitor naltrexone and EKG to monitor QTc on Prozac.\par \par Reports she has started to eat some breakfast in the morning before taking her pills and GI sx have improved. Also advised by acupuncturist to add some protein or fat to her first meal.

## 2020-11-19 ENCOUNTER — TRANSCRIPTION ENCOUNTER (OUTPATIENT)
Age: 50
End: 2020-11-19

## 2020-11-19 LAB
ALBUMIN SERPL ELPH-MCNC: 4.4 G/DL
ALP BLD-CCNC: 59 U/L
ALT SERPL-CCNC: 17 U/L
ANION GAP SERPL CALC-SCNC: 12 MMOL/L
AST SERPL-CCNC: 15 U/L
BASOPHILS # BLD AUTO: 0.06 K/UL
BASOPHILS NFR BLD AUTO: 1 %
BILIRUB SERPL-MCNC: <0.2 MG/DL
BUN SERPL-MCNC: 16 MG/DL
CALCIUM SERPL-MCNC: 9.6 MG/DL
CHLORIDE SERPL-SCNC: 100 MMOL/L
CO2 SERPL-SCNC: 24 MMOL/L
CREAT SERPL-MCNC: 0.73 MG/DL
EOSINOPHIL # BLD AUTO: 0.17 K/UL
EOSINOPHIL NFR BLD AUTO: 2.8 %
ESTIMATED AVERAGE GLUCOSE: 151 MG/DL
GLUCOSE SERPL-MCNC: 109 MG/DL
HBA1C MFR BLD HPLC: 6.9 %
HCT VFR BLD CALC: 36.5 %
HGB BLD-MCNC: 10.9 G/DL
IMM GRANULOCYTES NFR BLD AUTO: 0.5 %
LYMPHOCYTES # BLD AUTO: 1.77 K/UL
LYMPHOCYTES NFR BLD AUTO: 29.1 %
MAN DIFF?: NORMAL
MCHC RBC-ENTMCNC: 24.9 PG
MCHC RBC-ENTMCNC: 29.9 GM/DL
MCV RBC AUTO: 83.3 FL
MONOCYTES # BLD AUTO: 0.65 K/UL
MONOCYTES NFR BLD AUTO: 10.7 %
NEUTROPHILS # BLD AUTO: 3.4 K/UL
NEUTROPHILS NFR BLD AUTO: 55.9 %
PLATELET # BLD AUTO: 403 K/UL
POTASSIUM SERPL-SCNC: 4.5 MMOL/L
PROT SERPL-MCNC: 7 G/DL
RBC # BLD: 4.38 M/UL
RBC # FLD: 15.9 %
SODIUM SERPL-SCNC: 136 MMOL/L
WBC # FLD AUTO: 6.08 K/UL

## 2020-11-22 ENCOUNTER — TRANSCRIPTION ENCOUNTER (OUTPATIENT)
Age: 50
End: 2020-11-22

## 2020-11-23 ENCOUNTER — TRANSCRIPTION ENCOUNTER (OUTPATIENT)
Age: 50
End: 2020-11-23

## 2020-11-24 ENCOUNTER — TRANSCRIPTION ENCOUNTER (OUTPATIENT)
Age: 50
End: 2020-11-24

## 2020-11-27 ENCOUNTER — TRANSCRIPTION ENCOUNTER (OUTPATIENT)
Age: 50
End: 2020-11-27

## 2020-12-03 ENCOUNTER — NON-APPOINTMENT (OUTPATIENT)
Age: 50
End: 2020-12-03

## 2020-12-09 ENCOUNTER — TRANSCRIPTION ENCOUNTER (OUTPATIENT)
Age: 50
End: 2020-12-09

## 2020-12-10 ENCOUNTER — TRANSCRIPTION ENCOUNTER (OUTPATIENT)
Age: 50
End: 2020-12-10

## 2020-12-15 ENCOUNTER — APPOINTMENT (OUTPATIENT)
Dept: INTERNAL MEDICINE | Facility: CLINIC | Age: 50
End: 2020-12-15
Payer: MEDICAID

## 2020-12-15 VITALS
TEMPERATURE: 97.16 F | WEIGHT: 157 LBS | SYSTOLIC BLOOD PRESSURE: 136 MMHG | BODY MASS INDEX: 30.82 KG/M2 | HEIGHT: 60 IN | OXYGEN SATURATION: 98 % | HEART RATE: 84 BPM | DIASTOLIC BLOOD PRESSURE: 72 MMHG

## 2020-12-15 PROCEDURE — 99072 ADDL SUPL MATRL&STAF TM PHE: CPT

## 2020-12-15 PROCEDURE — 99213 OFFICE O/P EST LOW 20 MIN: CPT | Mod: 25

## 2020-12-15 PROCEDURE — 90750 HZV VACC RECOMBINANT IM: CPT

## 2020-12-15 PROCEDURE — 90471 IMMUNIZATION ADMIN: CPT

## 2020-12-15 NOTE — HISTORY OF PRESENT ILLNESS
[FreeTextEntry1] : constipation\par T2DM [de-identified] : 51yo F seen for follow up.\par \par Alcohol use disorder --follows with dr miller-now doing group therapy at [facility]\par \par L. hand/wrist - gets acupuncture for this.\par \par Remainder of ROS reviewed and found to be negative.\par

## 2020-12-15 NOTE — PHYSICAL EXAM
[Well Nourished] : well nourished [No Lymphadenopathy] : no lymphadenopathy [Supple] : supple [No Accessory Muscle Use] : no accessory muscle use [Clear to Auscultation] : lungs were clear to auscultation bilaterally [Normal Rate] : normal rate  [Regular Rhythm] : with a regular rhythm [Normal S1, S2] : normal S1 and S2 [No CVA Tenderness] : no CVA  tenderness [No Spinal Tenderness] : no spinal tenderness [Grossly Normal Strength/Tone] : grossly normal strength/tone [No Rash] : no rash [Normal Affect] : the affect was normal [de-identified] : L hand in hard wrist splint

## 2020-12-15 NOTE — ASSESSMENT
[FreeTextEntry1] : 49yo f seen for follow up\par \par Alcohol use disorder - meds to be taken over by \par \par  T2DM -- metformin 500mg BID -no med changes\par           next appt in in march 2021\par \par L wrist  tendonitis - cont acupuncture\par \par >50% of this 25 minute visit was spent in face-to-face time counseling patient on management of t2dm + alcohol use disorder.\par

## 2020-12-23 ENCOUNTER — RX RENEWAL (OUTPATIENT)
Age: 50
End: 2020-12-23

## 2020-12-23 PROBLEM — Z12.11 ENCOUNTER FOR COLORECTAL CANCER SCREENING: Status: RESOLVED | Noted: 2019-08-06 | Resolved: 2020-12-23

## 2021-01-07 ENCOUNTER — TRANSCRIPTION ENCOUNTER (OUTPATIENT)
Age: 51
End: 2021-01-07

## 2021-01-08 ENCOUNTER — TRANSCRIPTION ENCOUNTER (OUTPATIENT)
Age: 51
End: 2021-01-08

## 2021-01-11 ENCOUNTER — TRANSCRIPTION ENCOUNTER (OUTPATIENT)
Age: 51
End: 2021-01-11

## 2021-01-20 ENCOUNTER — APPOINTMENT (OUTPATIENT)
Dept: INTERNAL MEDICINE | Facility: CLINIC | Age: 51
End: 2021-01-20
Payer: MEDICAID

## 2021-01-20 DIAGNOSIS — F10.21 ALCOHOL DEPENDENCE, IN REMISSION: ICD-10-CM

## 2021-01-20 DIAGNOSIS — M75.52 BURSITIS OF LEFT SHOULDER: ICD-10-CM

## 2021-01-20 PROCEDURE — 99214 OFFICE O/P EST MOD 30 MIN: CPT | Mod: 95

## 2021-01-22 ENCOUNTER — APPOINTMENT (OUTPATIENT)
Dept: OBGYN | Facility: CLINIC | Age: 51
End: 2021-01-22
Payer: MEDICAID

## 2021-01-22 VITALS
TEMPERATURE: 206.6 F | BODY MASS INDEX: 30.82 KG/M2 | HEIGHT: 60 IN | DIASTOLIC BLOOD PRESSURE: 70 MMHG | WEIGHT: 157 LBS | HEART RATE: 70 BPM | OXYGEN SATURATION: 98 % | SYSTOLIC BLOOD PRESSURE: 123 MMHG

## 2021-01-22 DIAGNOSIS — Z12.39 ENCOUNTER FOR OTHER SCREENING FOR MALIGNANT NEOPLASM OF BREAST: ICD-10-CM

## 2021-01-22 DIAGNOSIS — R92.2 INCONCLUSIVE MAMMOGRAM: ICD-10-CM

## 2021-01-22 DIAGNOSIS — Z01.419 ENCOUNTER FOR GYNECOLOGICAL EXAMINATION (GENERAL) (ROUTINE) W/OUT ABNORMAL FINDINGS: ICD-10-CM

## 2021-01-22 PROCEDURE — 99072 ADDL SUPL MATRL&STAF TM PHE: CPT

## 2021-01-22 PROCEDURE — 99396 PREV VISIT EST AGE 40-64: CPT

## 2021-01-23 LAB — HPV HIGH+LOW RISK DNA PNL CVX: NOT DETECTED

## 2021-01-25 PROBLEM — M75.52 BURSITIS OF LEFT SHOULDER: Status: ACTIVE | Noted: 2021-01-20

## 2021-01-25 PROBLEM — F10.21 ALCOHOL USE DISORDER, MODERATE, IN EARLY REMISSION: Noted: 2020-07-02

## 2021-01-25 NOTE — HISTORY OF PRESENT ILLNESS
[Home] : at home, [unfilled] , at the time of the visit. [Medical Office: ([facility])___] : at the medical office located in  [Verbal consent obtained from patient] : the patient, [unfilled] [de-identified] : Visit scheduled to f/u on AUD - now in extended remission. \par \par She had brought up the topic of potentially stopping naltrexone as she has been successfully abstinent for about 7.5 months (last drink 6/7/20).  She had called leaving a message with the question as she was out of medication.  Urged her to refill the medication and continue and we could discuss at this visit. Meanwhile she also discussed with [facility] and they also urged her to at least stay on naltrexone for a minimum of one year. \par She also reports she will be transitioning out of the "Early Recovery" group at [facility] and into their "Relapse Prevention" program.  \par \carmen Is still having dreams where she takes a drink in her dream. They feel like nightmares to her - reports they are always occurring in a vacation setting.  In her dream she tells herself "I know what the drink will do. It won't solve anything and it's going to cause me a lot of harm"\par As an aside, also reports some left shoulder and right wrist pain. Has a history of bursitis in the shoulder and tendinitis in her wrist.

## 2021-01-25 NOTE — PHYSICAL EXAM
[No Acute Distress] : no acute distress [Well-Appearing] : well-appearing [Normal Sclera/Conjunctiva] : normal sclera/conjunctiva [No Rash] : no rash [No Respiratory Distress] : no respiratory distress  [Normal Affect] : the affect was normal

## 2021-01-25 NOTE — ASSESSMENT
[FreeTextEntry1] : Long-term strategies for sustained remission discussed in detail.  \par Advise continuing naltrexone for min of one year which is what she was told that [facility]. When she reaches that milestone,We can discuss further strategies. \par She is fine with following that advice.  She will continue on the naltrexone daily. \par Also agree with the transition from the Early recovery group to the relapse prevention program.\par She knows to reach out between our schedule visits with any questions or concerns.\par Next appt. -  one month\par

## 2021-01-28 ENCOUNTER — NON-APPOINTMENT (OUTPATIENT)
Age: 51
End: 2021-01-28

## 2021-02-01 ENCOUNTER — NON-APPOINTMENT (OUTPATIENT)
Age: 51
End: 2021-02-01

## 2021-02-01 DIAGNOSIS — R89.9 UNSPECIFIED ABNORMAL FINDING IN SPECIMENS FROM OTHER ORGANS, SYSTEMS AND TISSUES: ICD-10-CM

## 2021-02-01 LAB — CYTOLOGY CVX/VAG DOC THIN PREP: ABNORMAL

## 2021-02-16 ENCOUNTER — OUTPATIENT (OUTPATIENT)
Dept: OUTPATIENT SERVICES | Facility: HOSPITAL | Age: 51
LOS: 1 days | End: 2021-02-16
Payer: COMMERCIAL

## 2021-02-16 ENCOUNTER — APPOINTMENT (OUTPATIENT)
Dept: ULTRASOUND IMAGING | Facility: HOSPITAL | Age: 51
End: 2021-02-16
Payer: MEDICAID

## 2021-02-16 DIAGNOSIS — R89.9 UNSPECIFIED ABNORMAL FINDING IN SPECIMENS FROM OTHER ORGANS, SYSTEMS AND TISSUES: ICD-10-CM

## 2021-02-16 PROCEDURE — 76856 US EXAM PELVIC COMPLETE: CPT

## 2021-02-16 PROCEDURE — 76856 US EXAM PELVIC COMPLETE: CPT | Mod: 26

## 2021-02-16 PROCEDURE — 76830 TRANSVAGINAL US NON-OB: CPT

## 2021-02-16 PROCEDURE — 76830 TRANSVAGINAL US NON-OB: CPT | Mod: 26

## 2021-02-17 ENCOUNTER — APPOINTMENT (OUTPATIENT)
Dept: INTERNAL MEDICINE | Facility: CLINIC | Age: 51
End: 2021-02-17
Payer: MEDICAID

## 2021-02-17 PROCEDURE — 99213 OFFICE O/P EST LOW 20 MIN: CPT | Mod: 95

## 2021-02-18 NOTE — HISTORY OF PRESENT ILLNESS
[Home] : at home, [unfilled] , at the time of the visit. [Medical Office: ([facility])___] : at the medical office located in  [Verbal consent obtained from patient] : the patient, [unfilled] [de-identified] : Visit scheduled to f/u on AUD, in sustained remission. On oral naltrexone, taking daily as prescribed, no side effects. Has transitioned from Group sessions centered around maintaining early remission to a group focusing on relapse prevention. This group is mostly male.  although the focus of this group is more appropriate for where she is in recovery, she felt the interaction was better in the prior group where there was a more equal distribution of men and women. She says the mem rarely speak - she winds up having to do most of the talking. As a supplement, she has also been participating in a weekly group with [facility]. She does not attend every week - not always in the mood.

## 2021-02-18 NOTE — PHYSICAL EXAM
[No Acute Distress] : no acute distress [Well-Appearing] : well-appearing [No Rash] : no rash [No Respiratory Distress] : no respiratory distress  [Normal Affect] : the affect was normal

## 2021-02-19 ENCOUNTER — NON-APPOINTMENT (OUTPATIENT)
Age: 51
End: 2021-02-19

## 2021-02-22 ENCOUNTER — NON-APPOINTMENT (OUTPATIENT)
Age: 51
End: 2021-02-22

## 2021-02-25 ENCOUNTER — TRANSCRIPTION ENCOUNTER (OUTPATIENT)
Age: 51
End: 2021-02-25

## 2021-02-25 ENCOUNTER — NON-APPOINTMENT (OUTPATIENT)
Age: 51
End: 2021-02-25

## 2021-02-25 LAB
ALBUMIN SERPL ELPH-MCNC: 4.4 G/DL
ALP BLD-CCNC: 62 U/L
ALT SERPL-CCNC: 20 U/L
ANION GAP SERPL CALC-SCNC: 13 MMOL/L
AST SERPL-CCNC: 22 U/L
BILIRUB SERPL-MCNC: 0.2 MG/DL
BUN SERPL-MCNC: 14 MG/DL
CALCIUM SERPL-MCNC: 9.5 MG/DL
CHLORIDE SERPL-SCNC: 97 MMOL/L
CO2 SERPL-SCNC: 24 MMOL/L
CREAT SERPL-MCNC: 0.79 MG/DL
GGT SERPL-CCNC: 13 U/L
GLUCOSE SERPL-MCNC: 182 MG/DL
POTASSIUM SERPL-SCNC: 4.4 MMOL/L
PROT SERPL-MCNC: 7 G/DL
SODIUM SERPL-SCNC: 135 MMOL/L

## 2021-02-26 NOTE — PHYSICAL EXAM
[Appropriately responsive] : appropriately responsive [Alert] : alert [No Acute Distress] : no acute distress [Soft] : soft [Non-tender] : non-tender [Non-distended] : non-distended [No HSM] : No HSM [No Lesions] : no lesions [Oriented x3] : oriented x3 [Examination Of The Breasts] : a normal appearance [No Masses] : no breast masses were palpable [Labia Majora] : normal [Labia Minora] : normal [Normal] : normal [FreeTextEntry7] : 16 wk size myoma [Enlarged ___ wks] : enlarged [unfilled] ~Uweeks [Uterine Adnexae] : non-palpable

## 2021-03-04 ENCOUNTER — OUTPATIENT (OUTPATIENT)
Dept: OUTPATIENT SERVICES | Facility: HOSPITAL | Age: 51
LOS: 1 days | End: 2021-03-04
Payer: COMMERCIAL

## 2021-03-04 ENCOUNTER — APPOINTMENT (OUTPATIENT)
Dept: ULTRASOUND IMAGING | Facility: HOSPITAL | Age: 51
End: 2021-03-04
Payer: MEDICAID

## 2021-03-04 ENCOUNTER — APPOINTMENT (OUTPATIENT)
Dept: MAMMOGRAPHY | Facility: HOSPITAL | Age: 51
End: 2021-03-04
Payer: MEDICAID

## 2021-03-04 ENCOUNTER — RESULT REVIEW (OUTPATIENT)
Age: 51
End: 2021-03-04

## 2021-03-04 DIAGNOSIS — Z00.8 ENCOUNTER FOR OTHER GENERAL EXAMINATION: ICD-10-CM

## 2021-03-04 PROCEDURE — 77063 BREAST TOMOSYNTHESIS BI: CPT | Mod: 26

## 2021-03-04 PROCEDURE — 77067 SCR MAMMO BI INCL CAD: CPT | Mod: 26

## 2021-03-04 PROCEDURE — 77063 BREAST TOMOSYNTHESIS BI: CPT

## 2021-03-04 PROCEDURE — 77067 SCR MAMMO BI INCL CAD: CPT

## 2021-03-05 ENCOUNTER — NON-APPOINTMENT (OUTPATIENT)
Age: 51
End: 2021-03-05

## 2021-03-05 DIAGNOSIS — N63.0 UNSPECIFIED LUMP IN UNSPECIFIED BREAST: ICD-10-CM

## 2021-03-08 ENCOUNTER — APPOINTMENT (OUTPATIENT)
Dept: MRI IMAGING | Facility: HOSPITAL | Age: 51
End: 2021-03-08
Payer: MEDICAID

## 2021-03-08 ENCOUNTER — OUTPATIENT (OUTPATIENT)
Dept: OUTPATIENT SERVICES | Facility: HOSPITAL | Age: 51
LOS: 1 days | End: 2021-03-08
Payer: COMMERCIAL

## 2021-03-08 ENCOUNTER — RESULT REVIEW (OUTPATIENT)
Age: 51
End: 2021-03-08

## 2021-03-08 DIAGNOSIS — D25.9 LEIOMYOMA OF UTERUS, UNSPECIFIED: ICD-10-CM

## 2021-03-08 PROCEDURE — A9579: CPT

## 2021-03-08 PROCEDURE — 72197 MRI PELVIS W/O & W/DYE: CPT

## 2021-03-08 PROCEDURE — 72197 MRI PELVIS W/O & W/DYE: CPT | Mod: 26

## 2021-03-09 ENCOUNTER — APPOINTMENT (OUTPATIENT)
Dept: INTERNAL MEDICINE | Facility: CLINIC | Age: 51
End: 2021-03-09
Payer: MEDICAID

## 2021-03-09 ENCOUNTER — LABORATORY RESULT (OUTPATIENT)
Age: 51
End: 2021-03-09

## 2021-03-09 VITALS
TEMPERATURE: 209.12 F | HEART RATE: 74 BPM | SYSTOLIC BLOOD PRESSURE: 120 MMHG | HEIGHT: 60 IN | WEIGHT: 161 LBS | BODY MASS INDEX: 31.61 KG/M2 | OXYGEN SATURATION: 98 % | DIASTOLIC BLOOD PRESSURE: 70 MMHG

## 2021-03-09 PROCEDURE — 99213 OFFICE O/P EST LOW 20 MIN: CPT

## 2021-03-09 PROCEDURE — 99072 ADDL SUPL MATRL&STAF TM PHE: CPT

## 2021-03-09 RX ORDER — OMEPRAZOLE 20 MG/1
20 TABLET, ORALLY DISINTEGRATING, DELAYED RELEASE ORAL
Qty: 100 | Refills: 0 | Status: COMPLETED | COMMUNITY
Start: 2019-09-27 | End: 2021-03-09

## 2021-03-10 ENCOUNTER — NON-APPOINTMENT (OUTPATIENT)
Age: 51
End: 2021-03-10

## 2021-03-10 DIAGNOSIS — D25.9 LEIOMYOMA OF UTERUS, UNSPECIFIED: ICD-10-CM

## 2021-03-10 DIAGNOSIS — N83.291 OTHER OVARIAN CYST, RIGHT SIDE: ICD-10-CM

## 2021-03-11 NOTE — PHYSICAL EXAM
[PERRL] : pupils equal round and reactive to light [No Lymphadenopathy] : no lymphadenopathy [Regular Rhythm] : with a regular rhythm [Normal S1, S2] : normal S1 and S2 [No Rash] : no rash [Normal Affect] : the affect was normal [Normal Insight/Judgement] : insight and judgment were intact [de-identified] : middle aged F, NAD

## 2021-03-11 NOTE — ASSESSMENT
[FreeTextEntry1] : 52yo F with hx of alcohol use disorder and T2DM who comes in for follow.\par \par \par constipation -- now taking some breakfast, with protein and vitamins\par                          taking a detox - better\par                           needs labs checked\par \par Alcohol overuse - with associated nutritional deficiency - check labs\par \par T2DM - last A1c 6.9- send microalbumin \par \par CPE at future date \par

## 2021-03-11 NOTE — HISTORY OF PRESENT ILLNESS
[FreeTextEntry1] : T2DM\par alcoholism [de-identified] : 52yo F with T2DM, obesity, alcohol overuse seen for follow up\par for 3 weeks has been on vitamins after seeing specialist at a [facility]\par Taking liver detox, sunflower, magnesium, fish oil,melatonin\par \par Remainder of ROS reviewed and found to be negative.\par

## 2021-03-16 ENCOUNTER — APPOINTMENT (OUTPATIENT)
Dept: ULTRASOUND IMAGING | Facility: HOSPITAL | Age: 51
End: 2021-03-16
Payer: MEDICAID

## 2021-03-16 ENCOUNTER — RESULT REVIEW (OUTPATIENT)
Age: 51
End: 2021-03-16

## 2021-03-16 ENCOUNTER — APPOINTMENT (OUTPATIENT)
Dept: MAMMOGRAPHY | Facility: HOSPITAL | Age: 51
End: 2021-03-16
Payer: MEDICAID

## 2021-03-16 ENCOUNTER — OUTPATIENT (OUTPATIENT)
Dept: OUTPATIENT SERVICES | Facility: HOSPITAL | Age: 51
LOS: 1 days | End: 2021-03-16
Payer: COMMERCIAL

## 2021-03-16 DIAGNOSIS — Z00.8 ENCOUNTER FOR OTHER GENERAL EXAMINATION: ICD-10-CM

## 2021-03-16 LAB
25(OH)D3 SERPL-MCNC: 32.7 NG/ML
ALBUMIN SERPL ELPH-MCNC: 4.5 G/DL
ALP BLD-CCNC: 57 U/L
ALT SERPL-CCNC: 21 U/L
AMYLASE/CREAT SERPL: 82 U/L
ANION GAP SERPL CALC-SCNC: 14 MMOL/L
APPEARANCE: CLEAR
AST SERPL-CCNC: 21 U/L
BACTERIA UR CULT: NORMAL
BACTERIA: NEGATIVE
BASOPHILS # BLD AUTO: 0.08 K/UL
BASOPHILS NFR BLD AUTO: 1.2 %
BILIRUB SERPL-MCNC: <0.2 MG/DL
BILIRUBIN URINE: NEGATIVE
BLOOD URINE: NEGATIVE
BUN SERPL-MCNC: 17 MG/DL
C PEPTIDE SERPL-MCNC: 2.9 NG/ML
CALCIUM SERPL-MCNC: 9.4 MG/DL
CALCIUM SERPL-MCNC: 9.4 MG/DL
CHLORIDE SERPL-SCNC: 100 MMOL/L
CHOLEST SERPL-MCNC: 202 MG/DL
CO2 SERPL-SCNC: 22 MMOL/L
COLOR: YELLOW
CREAT SERPL-MCNC: 0.67 MG/DL
CREAT SPEC-SCNC: 20 MG/DL
CRP SERPL-MCNC: <3 MG/L
EOSINOPHIL # BLD AUTO: 0.2 K/UL
EOSINOPHIL NFR BLD AUTO: 2.9 %
ERYTHROCYTE [SEDIMENTATION RATE] IN BLOOD BY WESTERGREN METHOD: 24 MM/HR
ESTIMATED AVERAGE GLUCOSE: 148 MG/DL
FERRITIN SERPL-MCNC: 13 NG/ML
FOLATE SERPL-MCNC: >20 NG/ML
FRUCTOSAMINE SERPL-MCNC: 306 UMOL/L
GLUCOSE QUALITATIVE U: NEGATIVE
GLUCOSE SERPL-MCNC: 102 MG/DL
HBA1C MFR BLD HPLC: 6.8 %
HCT VFR BLD CALC: 37.1 %
HCYS SERPL-MCNC: 10.9 UMOL/L
HDLC SERPL-MCNC: 48 MG/DL
HGB BLD-MCNC: 11.6 G/DL
HYALINE CASTS: 0 /LPF
IMM GRANULOCYTES NFR BLD AUTO: 0.3 %
IRON SATN MFR SERPL: 14 %
IRON SERPL-MCNC: 52 UG/DL
KETONES URINE: NEGATIVE
LDLC SERPL CALC-MCNC: 117 MG/DL
LEUKOCYTE ESTERASE URINE: NEGATIVE
LPL SERPL-CCNC: 61 U/L
LYMPHOCYTES # BLD AUTO: 1.93 K/UL
LYMPHOCYTES NFR BLD AUTO: 28.5 %
MAN DIFF?: NORMAL
MCHC RBC-ENTMCNC: 26.5 PG
MCHC RBC-ENTMCNC: 31.3 GM/DL
MCV RBC AUTO: 84.7 FL
MICROALBUMIN 24H UR DL<=1MG/L-MCNC: <1.2 MG/DL
MICROALBUMIN/CREAT 24H UR-RTO: NORMAL MG/G
MICROSCOPIC-UA: NORMAL
MONOCYTES # BLD AUTO: 0.65 K/UL
MONOCYTES NFR BLD AUTO: 9.6 %
NEUTROPHILS # BLD AUTO: 3.9 K/UL
NEUTROPHILS NFR BLD AUTO: 57.5 %
NITRITE URINE: NEGATIVE
NONHDLC SERPL-MCNC: 154 MG/DL
PARATHYROID HORMONE INTACT: 17 PG/ML
PH URINE: 6.5
PLATELET # BLD AUTO: 397 K/UL
POTASSIUM SERPL-SCNC: 4.4 MMOL/L
PROLACTIN SERPL-MCNC: 8.7 NG/ML
PROT SERPL-MCNC: 7.2 G/DL
PROTEIN URINE: NEGATIVE
RBC # BLD: 4.38 M/UL
RBC # FLD: 16.7 %
RED BLOOD CELLS URINE: 1 /HPF
SODIUM SERPL-SCNC: 136 MMOL/L
SPECIFIC GRAVITY URINE: 1.01
SQUAMOUS EPITHELIAL CELLS: 1 /HPF
T3FREE SERPL-MCNC: 2.37 PG/ML
T3RU NFR SERPL: 0.9 TBI
T4 FREE SERPL-MCNC: 1.5 NG/DL
TIBC SERPL-MCNC: 387 UG/DL
TRIGL SERPL-MCNC: 188 MG/DL
UIBC SERPL-MCNC: 335 UG/DL
UROBILINOGEN URINE: NORMAL
VIT B12 SERPL-MCNC: 710 PG/ML
WBC # FLD AUTO: 6.78 K/UL
WHITE BLOOD CELLS URINE: 0 /HPF
ZINC SERPL-MCNC: 157 UG/DL

## 2021-03-16 PROCEDURE — 76641 ULTRASOUND BREAST COMPLETE: CPT | Mod: 26

## 2021-03-16 PROCEDURE — 77065 DX MAMMO INCL CAD UNI: CPT

## 2021-03-16 PROCEDURE — 77065 DX MAMMO INCL CAD UNI: CPT | Mod: 26,RT

## 2021-03-16 PROCEDURE — 76641 ULTRASOUND BREAST COMPLETE: CPT

## 2021-03-17 ENCOUNTER — APPOINTMENT (OUTPATIENT)
Dept: INTERNAL MEDICINE | Facility: CLINIC | Age: 51
End: 2021-03-17
Payer: MEDICAID

## 2021-03-17 DIAGNOSIS — R92.8 OTHER ABNORMAL AND INCONCLUSIVE FINDINGS ON DIAGNOSTIC IMAGING OF BREAST: ICD-10-CM

## 2021-03-17 PROCEDURE — 99214 OFFICE O/P EST MOD 30 MIN: CPT | Mod: 95

## 2021-03-18 NOTE — HISTORY OF PRESENT ILLNESS
[FreeTextEntry1] : one month f/u for AUD + medication management [de-identified] : Visit is scheduled to follow up on alcohol use disorder, in sustained remission. On oral naltrexone, 50 mg, taking daily as prescribed with no side effects.  Has transitioned from group session centered on maintaining early remission, to a new group which is focused on relapse prevention.  Since this transition, she is finding the group less helpful - She is the only woman In that group, and finds it more difficult to open up to a room full of men, most of whom rarely speak during most of the sessions. \par \carmen Was doing well until her recent birthday. Has been feuding with her father on and off, and he did not call to wish her happy birthday this year. She feels very let down by him, is very angry about that, and now she is not speaking with him at all.  this is not the first time that has happened.  When they have these fallings out ,it is quite upsetting for her, and subsequently she had a bit of what she calls a "melt down".  Found herself feeling upset and hopeless. \par She reached out to Dr. Burroughs, her psychiatrist at [facility], who has now been managing her antidepressant medication.  Her fluoxetine was raised by Dr. Burroughs to 80 mg, and she has been on this new dose for six days. She thinks it may be helping.\par Regarding her alcohol use, she says events like this can be a challenging trigger for her.   She told herself " I'm not going to let him mess up my sobriety", and this has helped her to resist temptation.  \par She also reached out to Jasmin - and looking forward to connecting with her after playing phone tag for so long. \par

## 2021-03-18 NOTE — PLAN
[FreeTextEntry1] : Continue current MAT with naltrexone and BH support sessions.\par f/u in one month.\par she knows to reach out to schedule interval appt. if any concerns arise.

## 2021-03-19 ENCOUNTER — TRANSCRIPTION ENCOUNTER (OUTPATIENT)
Age: 51
End: 2021-03-19

## 2021-03-22 ENCOUNTER — RX RENEWAL (OUTPATIENT)
Age: 51
End: 2021-03-22

## 2021-03-23 ENCOUNTER — NON-APPOINTMENT (OUTPATIENT)
Age: 51
End: 2021-03-23

## 2021-03-30 ENCOUNTER — TRANSCRIPTION ENCOUNTER (OUTPATIENT)
Age: 51
End: 2021-03-30

## 2021-04-12 ENCOUNTER — RESULT REVIEW (OUTPATIENT)
Age: 51
End: 2021-04-12

## 2021-04-12 ENCOUNTER — OUTPATIENT (OUTPATIENT)
Dept: OUTPATIENT SERVICES | Facility: HOSPITAL | Age: 51
LOS: 1 days | End: 2021-04-12
Payer: COMMERCIAL

## 2021-04-12 ENCOUNTER — APPOINTMENT (OUTPATIENT)
Dept: RADIOLOGY | Facility: HOSPITAL | Age: 51
End: 2021-04-12
Payer: MEDICAID

## 2021-04-12 DIAGNOSIS — M54.5 LOW BACK PAIN: ICD-10-CM

## 2021-04-12 DIAGNOSIS — Z78.9 OTHER SPECIFIED HEALTH STATUS: ICD-10-CM

## 2021-04-12 PROCEDURE — 77080 DXA BONE DENSITY AXIAL: CPT

## 2021-04-12 PROCEDURE — 77080 DXA BONE DENSITY AXIAL: CPT | Mod: 26

## 2021-04-19 ENCOUNTER — NON-APPOINTMENT (OUTPATIENT)
Age: 51
End: 2021-04-19

## 2021-04-20 ENCOUNTER — APPOINTMENT (OUTPATIENT)
Dept: INTERNAL MEDICINE | Facility: CLINIC | Age: 51
End: 2021-04-20
Payer: MEDICAID

## 2021-04-20 ENCOUNTER — NON-APPOINTMENT (OUTPATIENT)
Age: 51
End: 2021-04-20

## 2021-04-20 PROCEDURE — 99214 OFFICE O/P EST MOD 30 MIN: CPT | Mod: 25

## 2021-04-20 PROCEDURE — 99072 ADDL SUPL MATRL&STAF TM PHE: CPT

## 2021-04-20 PROCEDURE — 93000 ELECTROCARDIOGRAM COMPLETE: CPT

## 2021-04-21 RX ORDER — HYDROXYZINE PAMOATE 25 MG/1
25 CAPSULE ORAL
Qty: 30 | Refills: 0 | Status: ACTIVE | COMMUNITY
Start: 2021-04-13

## 2021-04-21 NOTE — HISTORY OF PRESENT ILLNESS
[FreeTextEntry1] : f/u for AUD in remission [de-identified] : Has started using hydroxyzine 25 mg for sleep - now sleeping through the night.  Also increased fluoxetine to 80 mg - Dr Guzman managing.\par Back with Tues morning group at [facility] - relapse prevention group\par Also has weekly session with Ariela - one of the [facility] therapist - thurs at 1 PM\par Recent conflict with her father has been the biggest challenge /trigger RR ETOH - has been the main topic they have been addressing\par \par \par

## 2021-04-21 NOTE — ASSESSMENT
[FreeTextEntry1] : due for labs.\carmen Consulted with [facility]  who is also collaborating in the care of this patient during  the visit - discussed care plan and then provided warm handoff.\carmen HC then met with Ms MONAHAN for BH support.  Discussed revised care plan after their visit and plans for f/u.

## 2021-04-21 NOTE — PHYSICAL EXAM
[No Acute Distress] : no acute distress [Well-Appearing] : well-appearing [No Lymphadenopathy] : no lymphadenopathy [No Respiratory Distress] : no respiratory distress  [Clear to Auscultation] : lungs were clear to auscultation bilaterally [Regular Rhythm] : with a regular rhythm [Normal S1, S2] : normal S1 and S2 [No Edema] : there was no peripheral edema [Soft] : abdomen soft [Non Tender] : non-tender [Normal Affect] : the affect was normal

## 2021-04-25 ENCOUNTER — TRANSCRIPTION ENCOUNTER (OUTPATIENT)
Age: 51
End: 2021-04-25

## 2021-04-26 LAB
ALBUMIN SERPL ELPH-MCNC: 4.6 G/DL
ALP BLD-CCNC: 68 U/L
ALT SERPL-CCNC: 24 U/L
ANION GAP SERPL CALC-SCNC: 14 MMOL/L
AST SERPL-CCNC: 23 U/L
BASOPHILS # BLD AUTO: 0.06 K/UL
BASOPHILS NFR BLD AUTO: 0.8 %
BILIRUB SERPL-MCNC: <0.2 MG/DL
BUN SERPL-MCNC: 13 MG/DL
CALCIUM SERPL-MCNC: 9.5 MG/DL
CHLORIDE SERPL-SCNC: 99 MMOL/L
CO2 SERPL-SCNC: 23 MMOL/L
CREAT SERPL-MCNC: 0.67 MG/DL
EOSINOPHIL # BLD AUTO: 0.25 K/UL
EOSINOPHIL NFR BLD AUTO: 3.2 %
ESTIMATED AVERAGE GLUCOSE: 157 MG/DL
GLUCOSE SERPL-MCNC: 155 MG/DL
HBA1C MFR BLD HPLC: 7.1 %
HCT VFR BLD CALC: 38.8 %
HGB BLD-MCNC: 12.2 G/DL
IMM GRANULOCYTES NFR BLD AUTO: 0.3 %
LYMPHOCYTES # BLD AUTO: 1.92 K/UL
LYMPHOCYTES NFR BLD AUTO: 24.9 %
MAN DIFF?: NORMAL
MCHC RBC-ENTMCNC: 26.8 PG
MCHC RBC-ENTMCNC: 31.4 GM/DL
MCV RBC AUTO: 85.1 FL
MONOCYTES # BLD AUTO: 0.68 K/UL
MONOCYTES NFR BLD AUTO: 8.8 %
NEUTROPHILS # BLD AUTO: 4.78 K/UL
NEUTROPHILS NFR BLD AUTO: 62 %
PLATELET # BLD AUTO: 385 K/UL
POTASSIUM SERPL-SCNC: 4.6 MMOL/L
PROT SERPL-MCNC: 7.4 G/DL
RBC # BLD: 4.56 M/UL
RBC # FLD: 16 %
SODIUM SERPL-SCNC: 136 MMOL/L
WBC # FLD AUTO: 7.71 K/UL

## 2021-05-11 ENCOUNTER — ASOB RESULT (OUTPATIENT)
Age: 51
End: 2021-05-11

## 2021-05-11 ENCOUNTER — APPOINTMENT (OUTPATIENT)
Dept: OBGYN | Facility: CLINIC | Age: 51
End: 2021-05-11
Payer: MEDICAID

## 2021-05-11 PROCEDURE — 99072 ADDL SUPL MATRL&STAF TM PHE: CPT

## 2021-05-11 PROCEDURE — 76857 US EXAM PELVIC LIMITED: CPT

## 2021-05-11 PROCEDURE — 76830 TRANSVAGINAL US NON-OB: CPT

## 2021-05-17 ENCOUNTER — NON-APPOINTMENT (OUTPATIENT)
Age: 51
End: 2021-05-17

## 2021-05-17 ENCOUNTER — TRANSCRIPTION ENCOUNTER (OUTPATIENT)
Age: 51
End: 2021-05-17

## 2021-05-17 ENCOUNTER — APPOINTMENT (OUTPATIENT)
Dept: OPHTHALMOLOGY | Facility: CLINIC | Age: 51
End: 2021-05-17
Payer: MEDICAID

## 2021-05-17 PROCEDURE — 92083 EXTENDED VISUAL FIELD XM: CPT

## 2021-05-17 PROCEDURE — 92012 INTRM OPH EXAM EST PATIENT: CPT

## 2021-05-17 PROCEDURE — 99072 ADDL SUPL MATRL&STAF TM PHE: CPT

## 2021-05-18 ENCOUNTER — APPOINTMENT (OUTPATIENT)
Dept: INTERNAL MEDICINE | Facility: CLINIC | Age: 51
End: 2021-05-18
Payer: MEDICAID

## 2021-05-18 PROCEDURE — 99443: CPT

## 2021-06-21 ENCOUNTER — TRANSCRIPTION ENCOUNTER (OUTPATIENT)
Age: 51
End: 2021-06-21

## 2021-07-13 ENCOUNTER — TRANSCRIPTION ENCOUNTER (OUTPATIENT)
Age: 51
End: 2021-07-13

## 2021-07-27 ENCOUNTER — APPOINTMENT (OUTPATIENT)
Dept: INTERNAL MEDICINE | Facility: CLINIC | Age: 51
End: 2021-07-27
Payer: MEDICAID

## 2021-07-27 DIAGNOSIS — Z78.9 OTHER SPECIFIED HEALTH STATUS: ICD-10-CM

## 2021-07-27 PROCEDURE — 99214 OFFICE O/P EST MOD 30 MIN: CPT | Mod: 95

## 2021-07-27 NOTE — PHYSICAL EXAM
[No Acute Distress] : no acute distress [Normal Sclera/Conjunctiva] : normal sclera/conjunctiva [No Respiratory Distress] : no respiratory distress  [Grossly Normal Strength/Tone] : grossly normal strength/tone [No Rash] : no rash [Normal Affect] : the affect was normal

## 2021-07-27 NOTE — HISTORY OF PRESENT ILLNESS
[Home] : at home, [unfilled] , at the time of the visit. [Medical Office: ([facility])___] : at the medical office located in  [Verbal consent obtained from patient] : the patient, [unfilled] [FreeTextEntry1] : f/u for AUD and medication management [de-identified] : 5/18/21: \par June 8th will candice one year of sobriety. As of last visit, so far without any relapse which really surprises her. When she first started, never really thought she would make it to this milestone. \par Her support system is solidly in place:\par - She is attending her meetings at [facility], back with her Tuesday AM group, and having sessions with Lukasz twice a month.\par - Using tools in her toolbox, walking dogs nightly - still has many triggers so she is not taking anything for granted. \par - Takes naltrexone with dinner - does report that to some extent, she has lost some of the craving she used to experience - her mouth used to water for the taste of wine.. she rarely has that anymore. \par - psychiatrist at [facility] managing her depression meds\par \par \par 7/27/21: \par Reports she has been under an increased amount of stress lately. Her uncle has become increasingly debilitated and it looks as though he may need to go to a long term SNF for care.  Her sister is having issues at work, and the kids (has been taking care of her sisters 3 kids since they were little) are struggling with a summer still with COVID as a significant factor. They are despondent and rarely want to leave the house. \par She has reached 13 month milestone - feels stronger and more secure in her ability to maintain sobriety.  She continues to take Naltrexone every night around 6:30 with dinner. needs a refill. Cravings are not as bad as they used to be - hr commitment to not drink at all remains strong - she continues to draw on a toolbox of strategies and times when she otherwise might be tempted to have a drink. Meditates, takes long walks with her friends and her dogs, stays busy, makes sure she has quiet time to stay in touch with her inner self. \par She is seeing a new psychiatrist at [facility], Dr Bailey.  her fluoxetine was increased in May to 80 mg daily.  \par She is using hydroxyzine 25 mg HS for sleep which is working well.

## 2021-07-27 NOTE — ADDENDUM
[FreeTextEntry1] : 35 minutes spent in preparation of this visit, including review of previous notes and test results, interview and examination of patient, discussion of plan, arranging for appropriate testing and treatment, and documentation.\par

## 2021-08-17 ENCOUNTER — RESULT CHARGE (OUTPATIENT)
Age: 51
End: 2021-08-17

## 2021-08-18 ENCOUNTER — APPOINTMENT (OUTPATIENT)
Dept: INTERNAL MEDICINE | Facility: CLINIC | Age: 51
End: 2021-08-18

## 2021-08-18 ENCOUNTER — APPOINTMENT (OUTPATIENT)
Dept: INTERNAL MEDICINE | Facility: CLINIC | Age: 51
End: 2021-08-18
Payer: MEDICAID

## 2021-08-18 ENCOUNTER — NON-APPOINTMENT (OUTPATIENT)
Age: 51
End: 2021-08-18

## 2021-08-18 VITALS — SYSTOLIC BLOOD PRESSURE: 110 MMHG | DIASTOLIC BLOOD PRESSURE: 60 MMHG

## 2021-08-18 PROCEDURE — 99213 OFFICE O/P EST LOW 20 MIN: CPT | Mod: 25

## 2021-08-18 PROCEDURE — 99072 ADDL SUPL MATRL&STAF TM PHE: CPT

## 2021-08-18 PROCEDURE — 93000 ELECTROCARDIOGRAM COMPLETE: CPT

## 2021-08-18 NOTE — HISTORY OF PRESENT ILLNESS
[FreeTextEntry8] : here for f/u - \par Reports stressful time this past summer. Uncle in SNF,  kids still dealing with COVID restrictions.\par No relapses but has been a difficult summer. had to cancel appt with Dr MONAHAN - was due for labs and would like to get them all done today \par RE weight: Has been trying intermittent fasting for wt loss - she is not sure yet whether she will stick with it.  \par also due for ekg and lft's today\par seeing herbalist - has been taking various supplements

## 2021-08-22 ENCOUNTER — TRANSCRIPTION ENCOUNTER (OUTPATIENT)
Age: 51
End: 2021-08-22

## 2021-08-22 LAB
ALBUMIN SERPL ELPH-MCNC: 4.7 G/DL
ALP BLD-CCNC: 68 U/L
ALT SERPL-CCNC: 21 U/L
ANION GAP SERPL CALC-SCNC: 13 MMOL/L
AST SERPL-CCNC: 19 U/L
BASOPHILS # BLD AUTO: 0.06 K/UL
BASOPHILS NFR BLD AUTO: 1 %
BILIRUB SERPL-MCNC: 0.2 MG/DL
BUN SERPL-MCNC: 13 MG/DL
CALCIUM SERPL-MCNC: 9.7 MG/DL
CHLORIDE SERPL-SCNC: 101 MMOL/L
CO2 SERPL-SCNC: 24 MMOL/L
CREAT SERPL-MCNC: 0.74 MG/DL
EOSINOPHIL # BLD AUTO: 0.24 K/UL
EOSINOPHIL NFR BLD AUTO: 3.9 %
ESTIMATED AVERAGE GLUCOSE: 146 MG/DL
GLUCOSE SERPL-MCNC: 122 MG/DL
HBA1C MFR BLD HPLC: 6.7 %
HCT VFR BLD CALC: 43.5 %
HGB BLD-MCNC: 13.5 G/DL
IMM GRANULOCYTES NFR BLD AUTO: 0.5 %
LYMPHOCYTES # BLD AUTO: 1.87 K/UL
LYMPHOCYTES NFR BLD AUTO: 30.7 %
MAN DIFF?: NORMAL
MCHC RBC-ENTMCNC: 27.8 PG
MCHC RBC-ENTMCNC: 31 GM/DL
MCV RBC AUTO: 89.7 FL
MONOCYTES # BLD AUTO: 0.55 K/UL
MONOCYTES NFR BLD AUTO: 9 %
NEUTROPHILS # BLD AUTO: 3.34 K/UL
NEUTROPHILS NFR BLD AUTO: 54.9 %
PLATELET # BLD AUTO: 377 K/UL
POTASSIUM SERPL-SCNC: 4.5 MMOL/L
PROT SERPL-MCNC: 7.4 G/DL
RBC # BLD: 4.85 M/UL
RBC # FLD: 14.9 %
SODIUM SERPL-SCNC: 138 MMOL/L
TSH SERPL-ACNC: 1.99 UIU/ML
WBC # FLD AUTO: 6.09 K/UL

## 2021-08-23 ENCOUNTER — TRANSCRIPTION ENCOUNTER (OUTPATIENT)
Age: 51
End: 2021-08-23

## 2021-08-24 ENCOUNTER — TRANSCRIPTION ENCOUNTER (OUTPATIENT)
Age: 51
End: 2021-08-24

## 2021-09-16 ENCOUNTER — APPOINTMENT (OUTPATIENT)
Dept: ULTRASOUND IMAGING | Facility: HOSPITAL | Age: 51
End: 2021-09-16
Payer: MEDICAID

## 2021-09-16 ENCOUNTER — RESULT REVIEW (OUTPATIENT)
Age: 51
End: 2021-09-16

## 2021-09-16 ENCOUNTER — NON-APPOINTMENT (OUTPATIENT)
Age: 51
End: 2021-09-16

## 2021-09-16 ENCOUNTER — OUTPATIENT (OUTPATIENT)
Dept: OUTPATIENT SERVICES | Facility: HOSPITAL | Age: 51
LOS: 1 days | End: 2021-09-16
Payer: COMMERCIAL

## 2021-09-16 DIAGNOSIS — R92.8 OTHER ABNORMAL AND INCONCLUSIVE FINDINGS ON DIAGNOSTIC IMAGING OF BREAST: ICD-10-CM

## 2021-09-16 PROCEDURE — 76641 ULTRASOUND BREAST COMPLETE: CPT

## 2021-09-16 PROCEDURE — 76641 ULTRASOUND BREAST COMPLETE: CPT | Mod: 26

## 2021-09-20 ENCOUNTER — NON-APPOINTMENT (OUTPATIENT)
Age: 51
End: 2021-09-20

## 2021-09-21 ENCOUNTER — APPOINTMENT (OUTPATIENT)
Dept: INTERNAL MEDICINE | Facility: CLINIC | Age: 51
End: 2021-09-21
Payer: MEDICAID

## 2021-09-21 PROCEDURE — 99214 OFFICE O/P EST MOD 30 MIN: CPT | Mod: 95

## 2021-09-21 NOTE — ADDENDUM
[FreeTextEntry1] : 30 minutes spent in preparation of this visit, including review of previous notes and test results, interview and examination of patient, discussion of plan, arranging for appropriate testing and treatment, and documentation.

## 2021-09-21 NOTE — HISTORY OF PRESENT ILLNESS
[Home] : at home, [unfilled] , at the time of the visit. [Medical Office: ([facility])___] : at the medical office located in  [Verbal consent obtained from patient] : the patient, [unfilled] [de-identified] : 21: \par  will candice one year of sobriety. As of last visit, so far without any relapse which really surprises her. When she first started, never really thought she would make it to this milestone. \par Her support system is solidly in place:\par - She is attending her meetings at [facility], back with her  group, and having sessions with Lukasz twice a month.\par - Using tools in her toolbox, walking dogs nightly - still has many triggers so she is not taking anything for granted. \par - Takes naltrexone with dinner - does report that to some extent, she has lost some of the craving she used to experience - her mouth used to water for the taste of wine.. she rarely has that anymore. \par - psychiatrist at [facility] managing her depression meds\par \par \par 21: \par Reports she has been under an increased amount of stress lately. Her uncle has become increasingly debilitated and it looks as though he may need to go to a long term SNF for care.  Her sister is having issues at work, and the kids (has been taking care of her sisters 3 kids since they were little) are struggling with a summer still with COVID as a significant factor. They are despondent and rarely want to leave the house. \par She has reached 13 month milestone - feels stronger and more secure in her ability to maintain sobriety.  She continues to take Naltrexone every night around 6:30 with dinner. needs a refill. Cravings are not as bad as they used to be - hr commitment to not drink at all remains strong - she continues to draw on a toolbox of strategies and times when she otherwise might be tempted to have a drink. Meditates, takes long walks with her friends and her dogs, stays busy, makes sure she has quiet time to stay in touch with her inner self. \par She is seeing a new psychiatrist at [facility], Dr Bailey.  her fluoxetine was increased in May to 80 mg daily.  \par She is using hydroxyzine 25 mg HS for sleep which is working well. \par \par 21\par Uncle passed away last week. She says the week was difficult - he lived a long and good life but she wished he could have  at home.  is glad his suffering is over.   She had a transient yearning for a drink but wasn’t really seriously tempted - as usual, called her sister who set her straight right away. \par Kids are now in school everyday - no more remote option. she likes having the extra time during the day.  In the afternoons, she helps them with homework.  [FreeTextEntry1] : f/u for AUD and medication management

## 2021-10-05 ENCOUNTER — TRANSCRIPTION ENCOUNTER (OUTPATIENT)
Age: 51
End: 2021-10-05

## 2021-10-06 ENCOUNTER — TRANSCRIPTION ENCOUNTER (OUTPATIENT)
Age: 51
End: 2021-10-06

## 2021-10-19 ENCOUNTER — APPOINTMENT (OUTPATIENT)
Dept: INTERNAL MEDICINE | Facility: CLINIC | Age: 51
End: 2021-10-19
Payer: MEDICAID

## 2021-10-19 PROCEDURE — 99213 OFFICE O/P EST LOW 20 MIN: CPT | Mod: 95

## 2021-10-19 NOTE — HISTORY OF PRESENT ILLNESS
[Home] : at home, [unfilled] , at the time of the visit. [Medical Office: ([facility])___] : at the medical office located in  [Verbal consent obtained from patient] : the patient, [unfilled] [de-identified] : 21: \par  will candice one year of sobriety. As of last visit, so far without any relapse which really surprises her. When she first started, never really thought she would make it to this milestone. \par Her support system is solidly in place:\par - She is attending her meetings at [facility], back with her  group, and having sessions with Lukasz twice a month.\par - Using tools in her toolbox, walking dogs nightly - still has many triggers so she is not taking anything for granted. \par - Takes naltrexone with dinner - does report that to some extent, she has lost some of the craving she used to experience - her mouth used to water for the taste of wine.. she rarely has that anymore. \par - psychiatrist at [facility] managing her depression meds\par \par \par 21: \par Reports she has been under an increased amount of stress lately. Her uncle has become increasingly debilitated and it looks as though he may need to go to a long term SNF for care.  Her sister is having issues at work, and the kids (has been taking care of her sisters 3 kids since they were little) are struggling with a summer still with COVID as a significant factor. They are despondent and rarely want to leave the house. \par She has reached 13 month milestone - feels stronger and more secure in her ability to maintain sobriety.  She continues to take Naltrexone every night around 6:30 with dinner. needs a refill. Cravings are not as bad as they used to be - hr commitment to not drink at all remains strong - she continues to draw on a toolbox of strategies and times when she otherwise might be tempted to have a drink. Meditates, takes long walks with her friends and her dogs, stays busy, makes sure she has quiet time to stay in touch with her inner self. \par She is seeing a new psychiatrist at [facility], Dr Bailey.  her fluoxetine was increased in May to 80 mg daily.  \par She is using hydroxyzine 25 mg HS for sleep which is working well. \par \par 21\par Uncle passed away last week. She says the week was difficult - he lived a long and good life but she wished he could have  at home.  is glad his suffering is over.   She had a transient yearning for a drink but wasn’t really seriously tempted - as usual, called her sister who set her straight right away. \par Kids are now in school everyday - no more remote option. she likes having the extra time during the day.  In the afternoons, she helps them with homework. \par \par 10/19/21:\par Doing well. Taking naltrexone daily, no side effects. Continues to stay connected to care at [facility].  Has visits with psych Q 3 weeks, sees therapist Q 2-3 weeks, participates in group weekly on Thursday morning. \par Stress around the time of her Uncles  has diminished - did have a few difficult moment but as usual, reached out to her sister who is her go to when she feels any sense of slipping.  life now back to normal. \par Does have her brothers wedding coming up - again will be difficult as her father will be there which triggers a lot of anger in her.  She has strategies prepared.

## 2021-10-26 ENCOUNTER — APPOINTMENT (OUTPATIENT)
Dept: INTERNAL MEDICINE | Facility: CLINIC | Age: 51
End: 2021-10-26

## 2021-11-11 ENCOUNTER — TRANSCRIPTION ENCOUNTER (OUTPATIENT)
Age: 51
End: 2021-11-11

## 2021-11-12 ENCOUNTER — APPOINTMENT (OUTPATIENT)
Dept: OPHTHALMOLOGY | Facility: CLINIC | Age: 51
End: 2021-11-12

## 2021-11-16 ENCOUNTER — APPOINTMENT (OUTPATIENT)
Dept: INTERNAL MEDICINE | Facility: CLINIC | Age: 51
End: 2021-11-16
Payer: MEDICAID

## 2021-11-16 VITALS
OXYGEN SATURATION: 98 % | TEMPERATURE: 208.94 F | SYSTOLIC BLOOD PRESSURE: 136 MMHG | WEIGHT: 161 LBS | DIASTOLIC BLOOD PRESSURE: 74 MMHG | BODY MASS INDEX: 31.61 KG/M2 | HEIGHT: 60 IN | HEART RATE: 82 BPM

## 2021-11-16 PROCEDURE — 99072 ADDL SUPL MATRL&STAF TM PHE: CPT

## 2021-11-16 PROCEDURE — 99214 OFFICE O/P EST MOD 30 MIN: CPT | Mod: 95

## 2021-11-16 PROCEDURE — 99396 PREV VISIT EST AGE 40-64: CPT | Mod: 25

## 2021-11-16 RX ORDER — MELOXICAM 15 MG/1
15 TABLET ORAL
Qty: 90 | Refills: 3 | Status: COMPLETED | COMMUNITY
Start: 2019-11-14 | End: 2021-11-16

## 2021-11-16 NOTE — ASSESSMENT
[FreeTextEntry1] : 52yo F T2DM, hypothyroidism seen for CPE.\par \par CPE today\par Labs today \par Flu shot today\par \par will see new GYN,  Mammogram will  be ordered by GYN\par sees ophtho\par sees dental\par sees derm\par \par Alcohol misuse -- on naltrexone, follows with Dr. Lopez

## 2021-11-16 NOTE — HISTORY OF PRESENT ILLNESS
[FreeTextEntry1] : CPE [de-identified] : 52yo F with hxo f alcohol abuse, T2DM seen for cpe.\par soc hx : physical activity with daily activities, walking dog,\par used to see Dr. Pritchard -GYN\par GI with Dr. Hopson - had in 2019 -- 2022 due as polyps were found\par  \par \par Remainder of ROS reviewed and found to be negative.\par

## 2021-11-16 NOTE — PHYSICAL EXAM
[de-identified] : Gen: Adult F, NAD\par Head: NC/AT\par EENT: ears grossly normal, PERRL, EOMI, moist mucosa\par Neck: supple\par Chest wall: nontender\par CV: normal s1 +s2, rrr, no murmurs\par Pulm: CTA-B\par Abd: soft, NT, ND\par Skin: no rashes\par Back: no CVA tenderness, no spinal tenderness\par Neuro: gait normal, AAOx3\par Psych: normal affect, normal interaction\par

## 2021-11-18 NOTE — HISTORY OF PRESENT ILLNESS
[de-identified] : 21: \par  will candice one year of sobriety. As of last visit, so far without any relapse which really surprises her. When she first started, never really thought she would make it to this milestone. \par Her support system is solidly in place:\par - She is attending her meetings at [facility], back with her  group, and having sessions with Lukasz twice a month.\par - Using tools in her toolbox, walking dogs nightly - still has many triggers so she is not taking anything for granted. \par - Takes naltrexone with dinner - does report that to some extent, she has lost some of the craving she used to experience - her mouth used to water for the taste of wine.. she rarely has that anymore. \par - psychiatrist at [facility] managing her depression meds\par \par \par 21: \par Reports she has been under an increased amount of stress lately. Her uncle has become increasingly debilitated and it looks as though he may need to go to a long term SNF for care.  Her sister is having issues at work, and the kids (has been taking care of her sisters 3 kids since they were little) are struggling with a summer still with COVID as a significant factor. They are despondent and rarely want to leave the house. \par She has reached 13 month milestone - feels stronger and more secure in her ability to maintain sobriety.  She continues to take Naltrexone every night around 6:30 with dinner. needs a refill. Cravings are not as bad as they used to be - hr commitment to not drink at all remains strong - she continues to draw on a toolbox of strategies and times when she otherwise might be tempted to have a drink. Meditates, takes long walks with her friends and her dogs, stays busy, makes sure she has quiet time to stay in touch with her inner self. \par She is seeing a new psychiatrist at [facility], Dr Bailey.  her fluoxetine was increased in May to 80 mg daily.  \par She is using hydroxyzine 25 mg HS for sleep which is working well. \par \par 21\par Uncle passed away last week. She says the week was difficult - he lived a long and good life but she wished he could have  at home.  is glad his suffering is over.   She had a transient yearning for a drink but wasn’t really seriously tempted - as usual, called her sister who set her straight right away. \par Kids are now in school everyday - no more remote option. she likes having the extra time during the day.  In the afternoons, she helps them with homework. \par \par 10/19/21:\par Doing well. Taking naltrexone daily, no side effects. Continues to stay connected to care at [facility].  Has visits with psych Q 3 weeks, sees therapist Q 2-3 weeks, participates in group weekly on Thursday morning. \par Stress around the time of her Uncles  has diminished - did have a few difficult moment but as usual, reached out to her sister who is her go to when she feels any sense of slipping.  life now back to normal. \carmen Does have her brothers wedding coming up - again will be difficult as her father will be there which triggers a lot of anger in her.  She has strategies prepared. \par \par 21\par Recently saw Dr Velez and got her flu shot. \carmen Has been taking Naltrexone daily - reports no recent craving.  Last she can recall was at the time of her uncle's death, however even that was manageable with her usual strategies. \par [facility] - \par See's her therapist Claudine Q 2-3 weeks for 1:1 therapy sessions, \par participates in group session Q Thurs AM - all female group.\par See's Psychiatrist Dr Ocampo Q 3-4 weeks. \par One concern of her remains her weight - somewhat frustrated that she is at a plateau. Knows she is exercising less then in the past - walking her dog less.  The hours that she helps her sister has changed - she is starting earlier so less time available to exercise. \par

## 2021-11-18 NOTE — ASSESSMENT
[FreeTextEntry1] : RE AUD: Doing well - sustained remission.  continue current treatment plan using Naltrexone and BH support with [facility] - f/u in one month. \par RE MDD: on Prozac and follows with psychiatrist at [facility]. \par RE obesity: frustrated with inability to lose further weight.  Discussed options - she would be interested in GLP1 to replace metformin.\par Can address with her PCP, discuss further at f/u. \par

## 2021-12-14 ENCOUNTER — APPOINTMENT (OUTPATIENT)
Dept: INTERNAL MEDICINE | Facility: CLINIC | Age: 51
End: 2021-12-14
Payer: MEDICAID

## 2021-12-14 PROCEDURE — 99213 OFFICE O/P EST LOW 20 MIN: CPT | Mod: 95

## 2021-12-14 NOTE — HISTORY OF PRESENT ILLNESS
[Home] : at home, [unfilled] , at the time of the visit. [Medical Office: ([facility])___] : at the medical office located in  [Verbal consent obtained from patient] : the patient, [unfilled] [de-identified] : 21: \par  will candice one year of sobriety. As of last visit, so far without any relapse which really surprises her. When she first started, never really thought she would make it to this milestone. \par Her support system is solidly in place:\par - She is attending her meetings at [facility], back with her  group, and having sessions with Lukasz twice a month.\par - Using tools in her toolbox, walking dogs nightly - still has many triggers so she is not taking anything for granted. \par - Takes naltrexone with dinner - does report that to some extent, she has lost some of the craving she used to experience - her mouth used to water for the taste of wine.. she rarely has that anymore. \par - psychiatrist at [facility] managing her depression meds\par \par \par 21: \par Reports she has been under an increased amount of stress lately. Her uncle has become increasingly debilitated and it looks as though he may need to go to a long term SNF for care.  Her sister is having issues at work, and the kids (has been taking care of her sisters 3 kids since they were little) are struggling with a summer still with COVID as a significant factor. They are despondent and rarely want to leave the house. \par She has reached 13 month milestone - feels stronger and more secure in her ability to maintain sobriety.  She continues to take Naltrexone every night around 6:30 with dinner. needs a refill. Cravings are not as bad as they used to be - hr commitment to not drink at all remains strong - she continues to draw on a toolbox of strategies and times when she otherwise might be tempted to have a drink. Meditates, takes long walks with her friends and her dogs, stays busy, makes sure she has quiet time to stay in touch with her inner self. \par She is seeing a new psychiatrist at [facility], Dr Bailey.  her fluoxetine was increased in May to 80 mg daily.  \par She is using hydroxyzine 25 mg HS for sleep which is working well. \par \par 21\par Uncle passed away last week. She says the week was difficult - he lived a long and good life but she wished he could have  at home.  is glad his suffering is over.   She had a transient yearning for a drink but wasn’t really seriously tempted - as usual, called her sister who set her straight right away. \par Kids are now in school everyday - no more remote option. she likes having the extra time during the day.  In the afternoons, she helps them with homework. \par \par 10/19/21:\par Doing well. Taking naltrexone daily, no side effects. Continues to stay connected to care at [facility].  Has visits with psych Q 3 weeks, sees therapist Q 2-3 weeks, participates in group weekly on Thursday morning. \par Stress around the time of her Uncles  has diminished - did have a few difficult moment but as usual, reached out to her sister who is her go to when she feels any sense of slipping.  life now back to normal. \carmen Does have her brothers wedding coming up - again will be difficult as her father will be there which triggers a lot of anger in her.  She has strategies prepared. \par \par 21\par Recently saw Dr Velez and got her flu shot. \par Has been taking Naltrexone daily - reports no recent craving.  Last she can recall was at the time of her uncle's death, however even that was manageable with her usual strategies. \par Still working with [facility] - \par See's her therapist Claudine Q 2-3 weeks for 1:1 therapy sessions, \par participates in group session Q Thurs AM - all female group.\par See's Psychiatrist Dr Ocampo Q 3-4 weeks. \par One concern of her remains her weight - somewhat frustrated that she is at a plateau. Knows she is exercising less then in the past - walking her dog less.  The hours that she helps her sister has changed - she is starting earlier so less time available to exercise. \par \par 21\par Reports has been dong well. No cravings. Staying on track. Continues to take naltrexone nightly with no side effects.  Not missing any doses. Still connected to care at [facility] for  support and sees psychiatrist ~ once a month.  Fluoxitine has been increased to 80 mg daily. She takes two 40 mg pills QD. \par Scheduled to take booster on Friday.  She has a bit of a cold - if she feels any worse may delay taking the booster.

## 2021-12-14 NOTE — ASSESSMENT
[FreeTextEntry1] : RE AUD: in sustained remission.  using Naltrexone daily and has good BH support with [facility] and good family support as well. \par RE MDD: under care of psychiatrist at [facility].  fluoxitine has been increased to 80 mg QD. Last EKG and labs ~ 6 months ago and at lower dose of ssri. \par \par f/u one month for in-office visit - ekg and labs due

## 2021-12-17 ENCOUNTER — TRANSCRIPTION ENCOUNTER (OUTPATIENT)
Age: 51
End: 2021-12-17

## 2021-12-20 ENCOUNTER — TRANSCRIPTION ENCOUNTER (OUTPATIENT)
Age: 51
End: 2021-12-20

## 2021-12-27 ENCOUNTER — RX RENEWAL (OUTPATIENT)
Age: 51
End: 2021-12-27

## 2022-01-11 ENCOUNTER — TRANSCRIPTION ENCOUNTER (OUTPATIENT)
Age: 52
End: 2022-01-11

## 2022-01-12 ENCOUNTER — NON-APPOINTMENT (OUTPATIENT)
Age: 52
End: 2022-01-12

## 2022-01-12 ENCOUNTER — TRANSCRIPTION ENCOUNTER (OUTPATIENT)
Age: 52
End: 2022-01-12

## 2022-01-13 ENCOUNTER — LABORATORY RESULT (OUTPATIENT)
Age: 52
End: 2022-01-13

## 2022-01-13 ENCOUNTER — APPOINTMENT (OUTPATIENT)
Dept: INTERNAL MEDICINE | Facility: CLINIC | Age: 52
End: 2022-01-13
Payer: MEDICAID

## 2022-01-13 DIAGNOSIS — U07.1 COVID-19: ICD-10-CM

## 2022-01-13 PROCEDURE — 99214 OFFICE O/P EST MOD 30 MIN: CPT

## 2022-01-13 PROCEDURE — 99072 ADDL SUPL MATRL&STAF TM PHE: CPT

## 2022-01-13 NOTE — ASSESSMENT
[FreeTextEntry1] : COVID-19 infection:\carmen Discussed that her symptoms are most like related to COVID-19 infection and will hopefully resolve. \par Prolonged symptoms - about 1 month now.  Seems to be improving, albeit slowly.\par Will check blood work today for evaluation of anemia, renal function, liver enzymes, vitamin levels, thyroid, as well as inflammatory makers.  Patient would also like to rule out other disorders such as hepatitis, HIV, tuberculosis.  \par Advised to restart her levothyroxine even if needing to take with some food.  \carmen Has continued her fluoxetine.\par Will try to restart metformin, just 500mg, with food on days she is eating ok.  Increase back to 1,000mg daily once GI symptoms have resolved and is eating normally. \par Will start Pepcid for epigastric discomfort and nausea - daily x 2 weeks, then as needed.  Declines antiemetic at this time.  \par Continue with bland foods and lots of food, increase as tolerated.  \par Activity as tolerated.  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  Has not been able to do that yet.  \carmen I will discuss visit and results with PCP, Dr. Alberto, as well. \carmen

## 2022-01-13 NOTE — PHYSICAL EXAM
[No Edema] : there was no peripheral edema [Soft] : abdomen soft [Non-distended] : non-distended [No HSM] : no HSM [Normal Bowel Sounds] : normal bowel sounds [Normal Anterior Cervical Nodes] : no anterior cervical lymphadenopathy [Normal] : no joint swelling and grossly normal strength and tone [Coordination Grossly Intact] : coordination grossly intact [No Focal Deficits] : no focal deficits [Normal Gait] : normal gait [de-identified] : mild epigastric tenderness

## 2022-01-13 NOTE — REVIEW OF SYSTEMS
[Chills] : chills [Fatigue] : fatigue [Night Sweats] : night sweats [Abdominal Pain] : abdominal pain [Nausea] : nausea [Diarrhea] : diarrhea [Vomiting] : vomiting [Dizziness] : dizziness [Negative] : Genitourinary [Fever] : no fever [Shortness Of Breath] : no shortness of breath [Wheezing] : no wheezing [Cough] : no cough [Constipation] : no constipation [Heartburn] : no heartburn [Headache] : no headache [Fainting] : no fainting

## 2022-01-13 NOTE — HISTORY OF PRESENT ILLNESS
[FreeTextEntry8] : 51 y.o. female, PMHx diabetes II, obesity, alcohol misuse (in recovery, on naltrexone), reactive airway, GERD, low back pain.  \par \par COVID infection last month, started 12/15.  Symptoms included fatigue, fever, chills, sweats, body aches, loss of taste and smell, cough, URI symptoms, dizziness.  Bought an O2 sat, which was always normal.  \par Presents with c/o ongoing symptoms.  Has some days when she feels ok, other days she can sleep for 14 hours.  Still has episodes of nausea, vomiting, diarrhea.  Has not taken metformin for the past 2 weeks d/t no eating properly.  Does not check BG. Lots of broth, water. Still with sweats/chills on occasion.  Occasional dizziness, but much less.  Mild frontal headache, seems to occur if she has not eaten.  Taste and smell not yet normal.  No longer with any URI symptoms.  No longer has body aches.  No dyspnea, no cough.  \par Not taking levothyroxine the past 10 days, worried she would not keep it down. .  \par Taking Singulair on occasion as that helps the GERD.  \par Not taking naltrexone, but no alcohol craving (sober since summer of 2020).\par Thinks she was feeling unwell for a few weeks prior to the diagnosis, wonders if had it brewing for a while.  Also concerned about another underlying disorder prolonging her symptoms.  \par

## 2022-01-14 LAB
25(OH)D3 SERPL-MCNC: 35.1 NG/ML
ALBUMIN SERPL ELPH-MCNC: 4.8 G/DL
ALP BLD-CCNC: 65 U/L
ALT SERPL-CCNC: 16 U/L
ANION GAP SERPL CALC-SCNC: 14 MMOL/L
APPEARANCE: CLEAR
AST SERPL-CCNC: 17 U/L
BASOPHILS # BLD AUTO: 0.05 K/UL
BASOPHILS NFR BLD AUTO: 0.6 %
BILIRUB SERPL-MCNC: 0.3 MG/DL
BILIRUBIN URINE: NEGATIVE
BLOOD URINE: NEGATIVE
BUN SERPL-MCNC: 11 MG/DL
CALCIUM SERPL-MCNC: 9.7 MG/DL
CHLORIDE SERPL-SCNC: 99 MMOL/L
CO2 SERPL-SCNC: 21 MMOL/L
COLOR: YELLOW
CREAT SERPL-MCNC: 0.76 MG/DL
CRP SERPL-MCNC: <3 MG/L
DEPRECATED D DIMER PPP IA-ACNC: 296 NG/ML DDU
EOSINOPHIL # BLD AUTO: 0.09 K/UL
EOSINOPHIL NFR BLD AUTO: 1.1 %
ERYTHROCYTE [SEDIMENTATION RATE] IN BLOOD BY WESTERGREN METHOD: 32 MM/HR
ESTIMATED AVERAGE GLUCOSE: 166 MG/DL
FERRITIN SERPL-MCNC: 22 NG/ML
FOLATE SERPL-MCNC: >20 NG/ML
GLUCOSE QUALITATIVE U: NEGATIVE
GLUCOSE SERPL-MCNC: 142 MG/DL
HBA1C MFR BLD HPLC: 7.4 %
HBV CORE IGG+IGM SER QL: NONREACTIVE
HBV SURFACE AB SERPL IA-ACNC: <3 MIU/ML
HCT VFR BLD CALC: 43.8 %
HCV AB SER QL: NONREACTIVE
HCV S/CO RATIO: 0.33 S/CO
HGB BLD-MCNC: 13.9 G/DL
HIV1+2 AB SPEC QL IA.RAPID: NONREACTIVE
IMM GRANULOCYTES NFR BLD AUTO: 0.5 %
KETONES URINE: NEGATIVE
LEUKOCYTE ESTERASE URINE: NEGATIVE
LYMPHOCYTES # BLD AUTO: 2.04 K/UL
LYMPHOCYTES NFR BLD AUTO: 24.8 %
MAN DIFF?: NORMAL
MCHC RBC-ENTMCNC: 28 PG
MCHC RBC-ENTMCNC: 31.7 GM/DL
MCV RBC AUTO: 88.3 FL
MONOCYTES # BLD AUTO: 0.75 K/UL
MONOCYTES NFR BLD AUTO: 9.1 %
NEUTROPHILS # BLD AUTO: 5.24 K/UL
NEUTROPHILS NFR BLD AUTO: 63.9 %
NITRITE URINE: NEGATIVE
PH URINE: 6
PLATELET # BLD AUTO: 402 K/UL
POTASSIUM SERPL-SCNC: 4.6 MMOL/L
PROT SERPL-MCNC: 7.6 G/DL
PROTEIN URINE: NEGATIVE
RBC # BLD: 4.96 M/UL
RBC # FLD: 14 %
SODIUM SERPL-SCNC: 135 MMOL/L
SPECIFIC GRAVITY URINE: 1.02
TSH SERPL-ACNC: 4.5 UIU/ML
UROBILINOGEN URINE: NORMAL
VIT B12 SERPL-MCNC: 866 PG/ML
WBC # FLD AUTO: 8.21 K/UL

## 2022-01-18 LAB
M TB IFN-G BLD-IMP: NEGATIVE
QUANTIFERON TB PLUS MITOGEN MINUS NIL: 8.82 IU/ML
QUANTIFERON TB PLUS NIL: 0.02 IU/ML
QUANTIFERON TB PLUS TB1 MINUS NIL: 0 IU/ML
QUANTIFERON TB PLUS TB2 MINUS NIL: 0 IU/ML

## 2022-01-21 ENCOUNTER — TRANSCRIPTION ENCOUNTER (OUTPATIENT)
Age: 52
End: 2022-01-21

## 2022-01-24 ENCOUNTER — APPOINTMENT (OUTPATIENT)
Dept: OBGYN | Facility: CLINIC | Age: 52
End: 2022-01-24

## 2022-01-25 ENCOUNTER — RESULT REVIEW (OUTPATIENT)
Age: 52
End: 2022-01-25

## 2022-01-25 ENCOUNTER — APPOINTMENT (OUTPATIENT)
Dept: OBGYN | Facility: CLINIC | Age: 52
End: 2022-01-25
Payer: MEDICAID

## 2022-01-25 ENCOUNTER — NON-APPOINTMENT (OUTPATIENT)
Age: 52
End: 2022-01-25

## 2022-01-25 VITALS
SYSTOLIC BLOOD PRESSURE: 130 MMHG | HEART RATE: 77 BPM | OXYGEN SATURATION: 98 % | HEIGHT: 60 IN | DIASTOLIC BLOOD PRESSURE: 70 MMHG

## 2022-01-25 PROCEDURE — 99072 ADDL SUPL MATRL&STAF TM PHE: CPT

## 2022-01-25 PROCEDURE — 99396 PREV VISIT EST AGE 40-64: CPT

## 2022-01-25 NOTE — HISTORY OF PRESENT ILLNESS
[FreeTextEntry1] : 52 yo P___ with LMP ___ presents today for well woman exam.\par \par \par POB: denies\par PGYN: ?perimenopausal, denies pelvic infections--remote hx of abl pap\par PMH: T2DM, former alcohol abuse, hypothyroid\par PSH: denies \par Med:  per MAR\par All: amox, shellfish\par SH: former smoker, former alcohol abuser\par

## 2022-01-25 NOTE — PHYSICAL EXAM
[Chaperone Present] : A chaperone was present in the examining room during all aspects of the physical examination [Appropriately responsive] : appropriately responsive [Alert] : alert [No Acute Distress] : no acute distress [Soft] : soft [Non-tender] : non-tender [Non-distended] : non-distended [No HSM] : No HSM [No Lesions] : no lesions [No Mass] : no mass [Oriented x3] : oriented x3 [Examination Of The Breasts] : a normal appearance [No Masses] : no breast masses were palpable [Labia Majora] : normal [Labia Minora] : normal [Normal] : normal [Enlarged ___ wks] : enlarged [unfilled] ~Uweeks [Uterine Adnexae] : normal

## 2022-01-25 NOTE — PLAN
[FreeTextEntry1] : Pt to have E2/FSH drawn for eval of menopausal status\par TVUS for myoma eval and for fu of ov cyst\par Mammo/sono for BIRADS 3\par Fu in one month to review labs and to remove labial wart\par \par I spent the time noted on the day of this patient encounter preparing for, providing and documenting the above E/M service and counseling and educate patient on differential, workup, disease course, and treatment/management. Education was provided to the patient during this encounter. All questions and concerns were answered and addressed in detail.\par \par Nano Wright MD\par

## 2022-01-27 DIAGNOSIS — Z86.018 PERSONAL HISTORY OF OTHER BENIGN NEOPLASM: ICD-10-CM

## 2022-01-27 LAB
C TRACH RRNA SPEC QL NAA+PROBE: NOT DETECTED
CANDIDA VAG CYTO: NOT DETECTED
G VAGINALIS+PREV SP MTYP VAG QL MICRO: NOT DETECTED
HCG UR QL: NEGATIVE
HPV HIGH+LOW RISK DNA PNL CVX: NOT DETECTED
N GONORRHOEA RRNA SPEC QL NAA+PROBE: NOT DETECTED
QUALITY CONTROL: YES
SOURCE AMPLIFICATION: NORMAL
T VAGINALIS VAG QL WET PREP: NOT DETECTED

## 2022-01-31 LAB
BASOPHILS # BLD AUTO: 0.07 K/UL
BASOPHILS NFR BLD AUTO: 0.9 %
DEPRECATED D DIMER PPP IA-ACNC: 335 NG/ML DDU
EOSINOPHIL # BLD AUTO: 0.17 K/UL
EOSINOPHIL NFR BLD AUTO: 2.1 %
ERYTHROCYTE [SEDIMENTATION RATE] IN BLOOD BY WESTERGREN METHOD: 27 MM/HR
HCT VFR BLD CALC: 40.4 %
HGB BLD-MCNC: 13.1 G/DL
IMM GRANULOCYTES NFR BLD AUTO: 0.5 %
LYMPHOCYTES # BLD AUTO: 2.41 K/UL
LYMPHOCYTES NFR BLD AUTO: 30.2 %
MAN DIFF?: NORMAL
MCHC RBC-ENTMCNC: 28.1 PG
MCHC RBC-ENTMCNC: 32.4 GM/DL
MCV RBC AUTO: 86.7 FL
MONOCYTES # BLD AUTO: 0.76 K/UL
MONOCYTES NFR BLD AUTO: 9.5 %
NEUTROPHILS # BLD AUTO: 4.54 K/UL
NEUTROPHILS NFR BLD AUTO: 56.8 %
PLATELET # BLD AUTO: 400 K/UL
RBC # BLD: 4.66 M/UL
RBC # FLD: 13.8 %
TSH SERPL-ACNC: 1.29 UIU/ML
WBC # FLD AUTO: 7.99 K/UL

## 2022-02-03 LAB — CYTOLOGY CVX/VAG DOC THIN PREP: ABNORMAL

## 2022-02-16 ENCOUNTER — TRANSCRIPTION ENCOUNTER (OUTPATIENT)
Age: 52
End: 2022-02-16

## 2022-02-16 ENCOUNTER — APPOINTMENT (OUTPATIENT)
Dept: OBGYN | Facility: CLINIC | Age: 52
End: 2022-02-16
Payer: MEDICAID

## 2022-02-16 DIAGNOSIS — K64.4 RESIDUAL HEMORRHOIDAL SKIN TAGS: ICD-10-CM

## 2022-02-16 DIAGNOSIS — Z12.72 ENCOUNTER FOR SCREENING FOR MALIGNANT NEOPLASM OF VAGINA: ICD-10-CM

## 2022-02-16 PROCEDURE — 99215 OFFICE O/P EST HI 40 MIN: CPT | Mod: 25

## 2022-02-16 PROCEDURE — 56606 BIOPSY OF VULVA/PERINEUM: CPT

## 2022-02-16 PROCEDURE — 99072 ADDL SUPL MATRL&STAF TM PHE: CPT

## 2022-02-16 PROCEDURE — 56605 BIOPSY OF VULVA/PERINEUM: CPT

## 2022-02-16 NOTE — PLAN
[FreeTextEntry1] : \par - Post procedure counseling given--patient to practice pelvic rest for 1 week, this includes nothing in the vagina, no submerging the vagina in water, no douching, no tampons\par \par I spent the time noted on the day of this patient encounter preparing for, providing and documenting the above E/M service and counseling and educate patient on differential, workup, disease course, and treatment/management. Education was provided to the patient during this encounter. All questions and concerns were answered and addressed in detail.\par \par Nano Wright MD\par

## 2022-02-16 NOTE — COUNSELING
[Confidentiality] : confidentiality [STD (testing, results, tx)] : STD (testing, results, tx) [Pre/Post Op Instructions] : pre/post op instructions

## 2022-02-16 NOTE — HISTORY OF PRESENT ILLNESS
[FreeTextEntry1] : 50 yo here today for removal of perianal skin tag. No complaints. Pap inconclusive at last visit, HPV negative--repeat today.\par \par \par Scheduled for mammo/sono and TVUS in March. \par FSH/E2 still not resulted, patient to get more labs today--will add on.

## 2022-02-16 NOTE — PROCEDURE
[Time out performed] : Pre-procedure time out performed.  Patient's name, date of birth and procedure confirmed. [Consent Obtained] : Consent obtained [] : in the Hoda-Anal region [Size of Biopsy Taken: ___ (mm)] : [unfilled]Umm [Local Anesthesia] : local anesthesia [____ Lidocaine w/o Epi] : ~VmL lidocaine without epinephrine [Sent to Pathology] : placed in buffered formalin and sent for pathology [Scalpel] : scalpel [Coagulation] : ball electrode using coagulation [Tolerated Well] : the patient tolerated the procedure well [No Complications] : there were no complications

## 2022-02-18 LAB
DEPRECATED D DIMER PPP IA-ACNC: 395 NG/ML DDU
ERYTHROCYTE [SEDIMENTATION RATE] IN BLOOD BY WESTERGREN METHOD: 43 MM/HR

## 2022-02-20 ENCOUNTER — NON-APPOINTMENT (OUTPATIENT)
Age: 52
End: 2022-02-20

## 2022-02-20 ENCOUNTER — RESULT CHARGE (OUTPATIENT)
Age: 52
End: 2022-02-20

## 2022-02-22 ENCOUNTER — NON-APPOINTMENT (OUTPATIENT)
Age: 52
End: 2022-02-22

## 2022-02-22 ENCOUNTER — APPOINTMENT (OUTPATIENT)
Dept: INTERNAL MEDICINE | Facility: CLINIC | Age: 52
End: 2022-02-22
Payer: MEDICAID

## 2022-02-22 VITALS
BODY MASS INDEX: 32.2 KG/M2 | WEIGHT: 164 LBS | DIASTOLIC BLOOD PRESSURE: 70 MMHG | HEIGHT: 60 IN | OXYGEN SATURATION: 98 % | HEART RATE: 82 BPM | SYSTOLIC BLOOD PRESSURE: 130 MMHG

## 2022-02-22 LAB
BASOPHILS # BLD AUTO: 0.07 K/UL
BASOPHILS NFR BLD AUTO: 0.8 %
CYTOLOGY CVX/VAG DOC THIN PREP: NORMAL
EOSINOPHIL # BLD AUTO: 0.16 K/UL
EOSINOPHIL NFR BLD AUTO: 1.8 %
ESTRADIOL SERPL-MCNC: 86 PG/ML
FSH: 10 MIU/ML
HCT VFR BLD CALC: 42.1 %
HGB BLD-MCNC: 13.3 G/DL
IMM GRANULOCYTES NFR BLD AUTO: 0.2 %
LYMPHOCYTES # BLD AUTO: 2.43 K/UL
LYMPHOCYTES NFR BLD AUTO: 27.8 %
MAN DIFF?: NORMAL
MCHC RBC-ENTMCNC: 27.9 PG
MCHC RBC-ENTMCNC: 31.6 GM/DL
MCV RBC AUTO: 88.3 FL
MONOCYTES # BLD AUTO: 0.64 K/UL
MONOCYTES NFR BLD AUTO: 7.3 %
NEUTROPHILS # BLD AUTO: 5.42 K/UL
NEUTROPHILS NFR BLD AUTO: 62.1 %
PLATELET # BLD AUTO: 431 K/UL
RBC # BLD: 4.77 M/UL
RBC # FLD: 14.1 %
WBC # FLD AUTO: 8.74 K/UL

## 2022-02-22 PROCEDURE — 99072 ADDL SUPL MATRL&STAF TM PHE: CPT

## 2022-02-22 PROCEDURE — 93000 ELECTROCARDIOGRAM COMPLETE: CPT

## 2022-02-22 PROCEDURE — 99214 OFFICE O/P EST MOD 30 MIN: CPT | Mod: 25

## 2022-02-24 ENCOUNTER — APPOINTMENT (OUTPATIENT)
Dept: OBGYN | Facility: CLINIC | Age: 52
End: 2022-02-24

## 2022-03-02 ENCOUNTER — TRANSCRIPTION ENCOUNTER (OUTPATIENT)
Age: 52
End: 2022-03-02

## 2022-03-02 ENCOUNTER — OUTPATIENT (OUTPATIENT)
Dept: OUTPATIENT SERVICES | Facility: HOSPITAL | Age: 52
LOS: 1 days | End: 2022-03-02
Payer: COMMERCIAL

## 2022-03-02 ENCOUNTER — APPOINTMENT (OUTPATIENT)
Dept: ULTRASOUND IMAGING | Facility: HOSPITAL | Age: 52
End: 2022-03-02
Payer: MEDICAID

## 2022-03-02 DIAGNOSIS — Z86.018 PERSONAL HISTORY OF OTHER BENIGN NEOPLASM: ICD-10-CM

## 2022-03-02 LAB — CORE LAB BIOPSY: NORMAL

## 2022-03-02 PROCEDURE — 76830 TRANSVAGINAL US NON-OB: CPT

## 2022-03-02 PROCEDURE — 76830 TRANSVAGINAL US NON-OB: CPT | Mod: 26

## 2022-03-03 ENCOUNTER — TRANSCRIPTION ENCOUNTER (OUTPATIENT)
Age: 52
End: 2022-03-03

## 2022-03-09 ENCOUNTER — APPOINTMENT (OUTPATIENT)
Dept: PHYSICAL MEDICINE AND REHAB | Facility: CLINIC | Age: 52
End: 2022-03-09
Payer: MEDICAID

## 2022-03-09 DIAGNOSIS — R53.82 CHRONIC FATIGUE, UNSPECIFIED: ICD-10-CM

## 2022-03-09 DIAGNOSIS — U09.9 CHRONIC FATIGUE, UNSPECIFIED: ICD-10-CM

## 2022-03-09 PROCEDURE — 99072 ADDL SUPL MATRL&STAF TM PHE: CPT

## 2022-03-09 PROCEDURE — 99202 OFFICE O/P NEW SF 15 MIN: CPT

## 2022-03-09 NOTE — SOCIAL HISTORY
[Lives Alone] : Only the patient, ~he/she~ lives alone [No Device Needed] : Patient doesn't use a device for ambulation

## 2022-03-09 NOTE — ASSESSMENT
[FreeTextEntry1] : Patient is a 51 yo F with a MHx of T2DM, depression, former chronic alcohol use, and hypothyroidism presenting with fatigue after a recent COVID infection. The patient has been getting enough sleep and still feels run down by the end of the day. On physical exam, the patient had 5/5 strength in all extremities. She denies any muscle pain. Her most recent TSH was normal. It was recommended to the patient that she go to physical therapy to work on her endurance. She was instructed to adhere to her medications and make sure she is getting enough rest and enough salt in her diet.

## 2022-03-09 NOTE — END OF VISIT
[] : A student assisted with documenting this visit. I have reviewed and verified all information documented by the student, and made modifications to such information, when appropriate. [FreeTextEntry3] : 52 year old woman with post covid fatigue, feels run down, usually at around 3 pm. Sleeps well at night. Does acupuncture for left hand pain/tendonitis, takes supplements including probiotics, multivitamins, folate, iron x months. Recommend starting PT for breathing exercises, start recumbent exercise to improve endurance. Follow up in six weeks.

## 2022-03-09 NOTE — PHYSICAL EXAM
[Normal] : Oriented to person, place, and time, insight and judgement were intact and the affect was normal [de-identified] : no respiratory distress  [de-identified] : warm, well perfused

## 2022-03-09 NOTE — REVIEW OF SYSTEMS
[Fatigue] : fatigue [Joint Pain] : joint pain [Negative] : Neurological [Fever] : no fever [Chills] : no chills [Recent Change In Weight] : ~T no recent weight change [Joint Stiffness] : no joint stiffness [Muscle Pain] : no muscle pain [Muscle Weakness] : no muscle weakness

## 2022-03-15 ENCOUNTER — APPOINTMENT (OUTPATIENT)
Dept: INTERNAL MEDICINE | Facility: CLINIC | Age: 52
End: 2022-03-15
Payer: MEDICAID

## 2022-03-15 VITALS
BODY MASS INDEX: 32.22 KG/M2 | HEART RATE: 98 BPM | OXYGEN SATURATION: 97 % | DIASTOLIC BLOOD PRESSURE: 74 MMHG | SYSTOLIC BLOOD PRESSURE: 128 MMHG | WEIGHT: 165 LBS | TEMPERATURE: 98 F

## 2022-03-15 PROCEDURE — 99072 ADDL SUPL MATRL&STAF TM PHE: CPT

## 2022-03-15 PROCEDURE — 99213 OFFICE O/P EST LOW 20 MIN: CPT

## 2022-03-17 ENCOUNTER — RESULT REVIEW (OUTPATIENT)
Age: 52
End: 2022-03-17

## 2022-03-17 ENCOUNTER — APPOINTMENT (OUTPATIENT)
Dept: ULTRASOUND IMAGING | Facility: CLINIC | Age: 52
End: 2022-03-17
Payer: MEDICAID

## 2022-03-17 ENCOUNTER — OUTPATIENT (OUTPATIENT)
Dept: OUTPATIENT SERVICES | Facility: HOSPITAL | Age: 52
LOS: 1 days | End: 2022-03-17
Payer: COMMERCIAL

## 2022-03-17 ENCOUNTER — APPOINTMENT (OUTPATIENT)
Dept: MAMMOGRAPHY | Facility: CLINIC | Age: 52
End: 2022-03-17
Payer: MEDICAID

## 2022-03-17 DIAGNOSIS — Z00.8 ENCOUNTER FOR OTHER GENERAL EXAMINATION: ICD-10-CM

## 2022-03-17 DIAGNOSIS — Z01.419 ENCOUNTER FOR GYNECOLOGICAL EXAMINATION (GENERAL) (ROUTINE) WITHOUT ABNORMAL FINDINGS: ICD-10-CM

## 2022-03-17 PROCEDURE — 77066 DX MAMMO INCL CAD BI: CPT

## 2022-03-17 PROCEDURE — G0279: CPT

## 2022-03-17 PROCEDURE — 77066 DX MAMMO INCL CAD BI: CPT | Mod: 26

## 2022-03-17 PROCEDURE — 76641 ULTRASOUND BREAST COMPLETE: CPT

## 2022-03-17 PROCEDURE — 76641 ULTRASOUND BREAST COMPLETE: CPT | Mod: 26,50

## 2022-03-17 PROCEDURE — G0279: CPT | Mod: 26

## 2022-03-17 NOTE — HISTORY OF PRESENT ILLNESS
[FreeTextEntry1] : long covid-19 [de-identified] : 51yo F with hx of T2DM and alcohol misuse seen for follow up.\par hx of covid19 - cont to have brain fog\par saw physiatry -- recommended PT to strengthen core\par \par has gained weight - just started weight watchers\par would want to change meds over metformin to help with weight loss\par \par will be sober for 2 yrs in June\par \par Remainder of ROS reviewed and found to be negative.

## 2022-03-17 NOTE — ASSESSMENT
[FreeTextEntry1] : 53yo F seen for folow up\par \par Alcohol misuse -- will be 2 years sober in June 2022 - congratulated pt -- remains on naltrexone - follows with Dr. Lopez\par \par Long covid19 - has seen cares program .saw PMR, monitor\par \par \par T2DM - stable, on metformin, will try ozempic for better glucose control and weightloss\par    will see if PA is needed. Pt will get labs done and then plan to start meds after this.\par

## 2022-03-17 NOTE — PHYSICAL EXAM
[No Edema] : there was no peripheral edema [No Palpable Aorta] : no palpable aorta [de-identified] : middled aged F, NAD

## 2022-03-21 ENCOUNTER — APPOINTMENT (OUTPATIENT)
Dept: ULTRASOUND IMAGING | Facility: HOSPITAL | Age: 52
End: 2022-03-21

## 2022-03-22 ENCOUNTER — TRANSCRIPTION ENCOUNTER (OUTPATIENT)
Age: 52
End: 2022-03-22

## 2022-03-24 ENCOUNTER — RESULT REVIEW (OUTPATIENT)
Age: 52
End: 2022-03-24

## 2022-03-24 ENCOUNTER — OUTPATIENT (OUTPATIENT)
Dept: OUTPATIENT SERVICES | Facility: HOSPITAL | Age: 52
LOS: 1 days | End: 2022-03-24
Payer: COMMERCIAL

## 2022-03-24 ENCOUNTER — APPOINTMENT (OUTPATIENT)
Dept: ULTRASOUND IMAGING | Facility: CLINIC | Age: 52
End: 2022-03-24
Payer: MEDICAID

## 2022-03-24 DIAGNOSIS — R92.8 OTHER ABNORMAL AND INCONCLUSIVE FINDINGS ON DIAGNOSTIC IMAGING OF BREAST: ICD-10-CM

## 2022-03-24 PROCEDURE — 77065 DX MAMMO INCL CAD UNI: CPT | Mod: 26,RT

## 2022-03-24 PROCEDURE — 88305 TISSUE EXAM BY PATHOLOGIST: CPT | Mod: 26

## 2022-03-24 PROCEDURE — A4648: CPT

## 2022-03-24 PROCEDURE — 88305 TISSUE EXAM BY PATHOLOGIST: CPT

## 2022-03-24 PROCEDURE — 77065 DX MAMMO INCL CAD UNI: CPT

## 2022-03-24 PROCEDURE — 19083 BX BREAST 1ST LESION US IMAG: CPT

## 2022-03-24 PROCEDURE — 19083 BX BREAST 1ST LESION US IMAG: CPT | Mod: RT

## 2022-03-25 LAB
CRP SERPL-MCNC: <3 MG/L
ERYTHROCYTE [SEDIMENTATION RATE] IN BLOOD BY WESTERGREN METHOD: 26 MM/HR
ESTIMATED AVERAGE GLUCOSE: 163 MG/DL
HBA1C MFR BLD HPLC: 7.3 %

## 2022-03-30 ENCOUNTER — APPOINTMENT (OUTPATIENT)
Dept: INTERNAL MEDICINE | Facility: CLINIC | Age: 52
End: 2022-03-30
Payer: MEDICAID

## 2022-03-30 PROCEDURE — 99214 OFFICE O/P EST MOD 30 MIN: CPT | Mod: 95

## 2022-03-31 ENCOUNTER — TRANSCRIPTION ENCOUNTER (OUTPATIENT)
Age: 52
End: 2022-03-31

## 2022-04-02 ENCOUNTER — TRANSCRIPTION ENCOUNTER (OUTPATIENT)
Age: 52
End: 2022-04-02

## 2022-04-06 ENCOUNTER — NON-APPOINTMENT (OUTPATIENT)
Age: 52
End: 2022-04-06

## 2022-04-06 ENCOUNTER — APPOINTMENT (OUTPATIENT)
Dept: OPHTHALMOLOGY | Facility: CLINIC | Age: 52
End: 2022-04-06
Payer: MEDICAID

## 2022-04-06 PROCEDURE — 92014 COMPRE OPH EXAM EST PT 1/>: CPT

## 2022-04-06 PROCEDURE — 92133 CPTRZD OPH DX IMG PST SGM ON: CPT

## 2022-04-06 PROCEDURE — 92083 EXTENDED VISUAL FIELD XM: CPT

## 2022-04-21 ENCOUNTER — APPOINTMENT (OUTPATIENT)
Dept: MRI IMAGING | Facility: CLINIC | Age: 52
End: 2022-04-21
Payer: MEDICAID

## 2022-04-21 ENCOUNTER — OUTPATIENT (OUTPATIENT)
Dept: OUTPATIENT SERVICES | Facility: HOSPITAL | Age: 52
LOS: 1 days | End: 2022-04-21
Payer: COMMERCIAL

## 2022-04-21 DIAGNOSIS — Z86.018 PERSONAL HISTORY OF OTHER BENIGN NEOPLASM: ICD-10-CM

## 2022-04-21 DIAGNOSIS — N83.291 OTHER OVARIAN CYST, RIGHT SIDE: ICD-10-CM

## 2022-04-21 DIAGNOSIS — D25.9 LEIOMYOMA OF UTERUS, UNSPECIFIED: ICD-10-CM

## 2022-04-21 DIAGNOSIS — Z00.8 ENCOUNTER FOR OTHER GENERAL EXAMINATION: ICD-10-CM

## 2022-04-21 PROCEDURE — 72197 MRI PELVIS W/O & W/DYE: CPT | Mod: 26

## 2022-04-21 PROCEDURE — 72197 MRI PELVIS W/O & W/DYE: CPT

## 2022-04-26 ENCOUNTER — APPOINTMENT (OUTPATIENT)
Dept: INTERNAL MEDICINE | Facility: CLINIC | Age: 52
End: 2022-04-26
Payer: MEDICAID

## 2022-04-26 PROCEDURE — 99212 OFFICE O/P EST SF 10 MIN: CPT | Mod: 95

## 2022-04-26 NOTE — HISTORY OF PRESENT ILLNESS
[Verbal consent obtained from patient] : the patient, [unfilled] [FreeTextEntry1] : T2DM [de-identified] : 53yo F with alcohol overuse and T2DM seen in video conference to follow up on T2DM\par since last visit she started taking ozempic for glucose and weight loss\par Started April 4, 2022 on medication\par recently increased the dose as instructed\par last A1c on file is 7.3\par eating small frequent meals - losing 1-2 lbs per week\par 64oz of water \par \par long covid19 symptoms is improving \par still with some fog but slowly getting better\par

## 2022-04-26 NOTE — ASSESSMENT
[FreeTextEntry1] : 53yo F with PMH of alcohol overuse and T2DM now on  new medication\par \par T2DM - last A1c of 7.3 - she has been stable in this range of high 6 and low 7's \par  on ozempic for about 1 month - will check glucose in 3 mon\par advised to cont lifestyle change with healthy eating and more physical activity\par no side effects so far - will cont to monitor\par \par telephone visit for 10minutes

## 2022-04-28 ENCOUNTER — APPOINTMENT (OUTPATIENT)
Dept: PHYSICAL MEDICINE AND REHAB | Facility: CLINIC | Age: 52
End: 2022-04-28

## 2022-05-02 ENCOUNTER — TRANSCRIPTION ENCOUNTER (OUTPATIENT)
Age: 52
End: 2022-05-02

## 2022-05-03 ENCOUNTER — APPOINTMENT (OUTPATIENT)
Dept: INTERNAL MEDICINE | Facility: CLINIC | Age: 52
End: 2022-05-03
Payer: MEDICAID

## 2022-05-03 PROCEDURE — 99214 OFFICE O/P EST MOD 30 MIN: CPT | Mod: 95

## 2022-05-03 NOTE — HISTORY OF PRESENT ILLNESS
[Home] : at home, [unfilled] , at the time of the visit. [Medical Office: ([facility])___] : at the medical office located in  [Verbal consent obtained from patient] : the patient, [unfilled] [de-identified] : started ozempic and very regimented diet and has lost 17 lbs\par started taking it 4/4/22\par Not craving ETOH. \par taking Naltrexone every evening.  no side effects. \par next month will be 2 years o fabstenence. \par long COVID finally seens to be resolving - took ablout 3.5 months\par re  support - thursday AM group and individual every 2 weeks \par See;s psych once a month. - fluoxitine 80 mg

## 2022-05-10 ENCOUNTER — TRANSCRIPTION ENCOUNTER (OUTPATIENT)
Age: 52
End: 2022-05-10

## 2022-05-24 ENCOUNTER — APPOINTMENT (OUTPATIENT)
Dept: OBGYN | Facility: CLINIC | Age: 52
End: 2022-05-24
Payer: MEDICAID

## 2022-05-24 VITALS
SYSTOLIC BLOOD PRESSURE: 110 MMHG | HEART RATE: 81 BPM | OXYGEN SATURATION: 99 % | BODY MASS INDEX: 27.88 KG/M2 | WEIGHT: 142 LBS | DIASTOLIC BLOOD PRESSURE: 72 MMHG | TEMPERATURE: 98.3 F | HEIGHT: 60 IN

## 2022-05-24 DIAGNOSIS — N92.0 EXCESSIVE AND FREQUENT MENSTRUATION WITH REGULAR CYCLE: ICD-10-CM

## 2022-05-24 PROCEDURE — 99214 OFFICE O/P EST MOD 30 MIN: CPT | Mod: 57

## 2022-05-25 PROBLEM — N92.0 MENORRHAGIA WITH REGULAR CYCLE: Status: ACTIVE | Noted: 2022-05-25

## 2022-05-25 NOTE — COUNSELING
[Confidentiality] : confidentiality [Medication Management] : medication management [Pre/Post Op Instructions] : pre/post op instructions

## 2022-05-25 NOTE — PLAN
[FreeTextEntry1] : \par \par Patient to start Lupron injections for the next three months, to schedule LISA/BS after that time.\par \par Plan for total abdominal hysterectomy, bilateral salpingectomy, and cystoscopy at [facility]. Patient is aware that there are medical management options that can reduce her symptoms such as oral contraceptive agents and LARC, however she has declined. She elects for definitive management\par \par We discussed the risks, benefits and alternatives of the procedure including, but not limited to: pain, bleeding, infection, risk of damage of structures surrounding the uterus including but not limited to the ovaries, bowel, bladder ureters, nerves, and vessels. The chance of deep vein thromboses and blood transfusion and fistula formation was also discussed. The patient expressed understanding of the risks, benefits and alternatives of the procedure and was able to explain them in her own words.\par \par She understands that full recovery will not take place until 8 weeks after surgery, and until that time she is to avoid any heavy lifting (no more than 10 pounds) exercise, or anything in the vagina (including fingers, toys, sex, or tub/pool/beach bathing)\par \par She may return to work after 2-3 weeks if she can follow the above requirements.\par \par I spent the time noted on the day of this patient encounter preparing for, providing and documenting the above E/M service and counseling and educate patient on differential, workup, disease course, and treatment/management. Education was provided to the patient during this encounter. All questions and concerns were answered and addressed in detail.\par \par Nano Wright MD\par \par

## 2022-05-25 NOTE — HISTORY OF PRESENT ILLNESS
[FreeTextEntry1] : Pt here today for review of MRI and surgical consultation for LISA/BS.\par \par MRI reviewed:\par \par UTERUS: Fibroid uterus measures 15.2 x 11.2 x 11.3 cm. Representative fibroids include: Enhancing posterior intramural lower uterine segment 7.0 x 6.2 x 6.0 cm fibroid, unchanged since 3/8/2020. An avidly enhancing, right uterine body intramural confluence of fibroids measuring 7.2 x 5.0 x 5.7 cm is also unchanged. An enhancing exophytic right fundal subserosal 4.3 x 3.3 x 4.0 cm fibroid is also stable.\par ENDOMETRIUM: 4 mm, within normal limits.\par JUNCTIONAL ZONE: Not thickened.\par \par RIGHT OVARY: Measures 3.0 x 2.1 x 2.1 cm, inclusive of a simple 1.9 cm follicle/cyst. No suspicious findings.\par LEFT OVARY: Measures 3.2 x 1.7 x 2.7 cm, within normal limits.\par ADNEXA: Within normal limits.\par

## 2022-05-25 NOTE — REVIEW OF SYSTEMS
I have only seen him once in 2021, he is due for a checkup. Please call mom to schedule. No concerns from that visit.  Up to date with shots [Abn Vaginal bleeding] : abnormal vaginal bleeding [Negative] : Heme/Lymph

## 2022-06-06 ENCOUNTER — TRANSCRIPTION ENCOUNTER (OUTPATIENT)
Age: 52
End: 2022-06-06

## 2022-06-17 ENCOUNTER — APPOINTMENT (OUTPATIENT)
Dept: OBGYN | Facility: CLINIC | Age: 52
End: 2022-06-17

## 2022-06-17 VITALS
BODY MASS INDEX: 27.88 KG/M2 | SYSTOLIC BLOOD PRESSURE: 110 MMHG | OXYGEN SATURATION: 98 % | HEART RATE: 82 BPM | TEMPERATURE: 98.2 F | HEIGHT: 60 IN | DIASTOLIC BLOOD PRESSURE: 70 MMHG | WEIGHT: 142 LBS

## 2022-06-17 LAB
HCG UR QL: NEGATIVE
QUALITY CONTROL: YES

## 2022-06-17 PROCEDURE — 11981 INSERTION DRUG DLVR IMPLANT: CPT

## 2022-06-17 PROCEDURE — 99214 OFFICE O/P EST MOD 30 MIN: CPT | Mod: 25

## 2022-06-28 ENCOUNTER — RX RENEWAL (OUTPATIENT)
Age: 52
End: 2022-06-28

## 2022-07-02 RX ADMIN — LEUPROLIDE ACETATE MG: KIT at 00:00

## 2022-07-02 NOTE — PLAN
[FreeTextEntry1] : \par \par I spent the time noted on the day of this patient encounter preparing for, providing and documenting the above E/M service and counseling and educate patient on differential, workup, disease course, and treatment/management. Education was provided to the patient during this encounter. All questions and concerns were answered and addressed in detail.\par \par Nano Wright MD\par

## 2022-07-05 ENCOUNTER — APPOINTMENT (OUTPATIENT)
Dept: INTERNAL MEDICINE | Facility: CLINIC | Age: 52
End: 2022-07-05

## 2022-07-05 ENCOUNTER — TRANSCRIPTION ENCOUNTER (OUTPATIENT)
Age: 52
End: 2022-07-05

## 2022-07-22 ENCOUNTER — TRANSCRIPTION ENCOUNTER (OUTPATIENT)
Age: 52
End: 2022-07-22

## 2022-08-05 ENCOUNTER — APPOINTMENT (OUTPATIENT)
Dept: OBGYN | Facility: CLINIC | Age: 52
End: 2022-08-05

## 2022-08-05 VITALS
DIASTOLIC BLOOD PRESSURE: 70 MMHG | OXYGEN SATURATION: 98 % | BODY MASS INDEX: 27.88 KG/M2 | HEIGHT: 60 IN | SYSTOLIC BLOOD PRESSURE: 110 MMHG | TEMPERATURE: 97.6 F | HEART RATE: 81 BPM | WEIGHT: 142 LBS

## 2022-08-05 LAB
HCG UR QL: NEGATIVE
QUALITY CONTROL: YES

## 2022-08-05 PROCEDURE — 99213 OFFICE O/P EST LOW 20 MIN: CPT | Mod: 25

## 2022-08-05 PROCEDURE — 11981 INSERTION DRUG DLVR IMPLANT: CPT

## 2022-08-21 RX ADMIN — LEUPROLIDE ACETATE MG: KIT at 00:00

## 2022-08-26 ENCOUNTER — RX RENEWAL (OUTPATIENT)
Age: 52
End: 2022-08-26

## 2022-09-06 ENCOUNTER — APPOINTMENT (OUTPATIENT)
Dept: OBGYN | Facility: CLINIC | Age: 52
End: 2022-09-06

## 2022-09-06 VITALS
OXYGEN SATURATION: 98 % | HEART RATE: 81 BPM | SYSTOLIC BLOOD PRESSURE: 110 MMHG | BODY MASS INDEX: 27.48 KG/M2 | HEIGHT: 60 IN | TEMPERATURE: 97.9 F | WEIGHT: 140 LBS | DIASTOLIC BLOOD PRESSURE: 70 MMHG

## 2022-09-06 PROCEDURE — 96372 THER/PROPH/DIAG INJ SC/IM: CPT

## 2022-09-06 PROCEDURE — 99214 OFFICE O/P EST MOD 30 MIN: CPT | Mod: 25

## 2022-09-06 NOTE — PLAN
[FreeTextEntry1] : Plan for total abdonimalhysterectomy, bilateral salpingoophorectomy on 10/3 at [facility]. Patient is aware that there are medical management options that can reduce her symptoms such as oral contraceptive agents and LARC, however she has declined. She has also been offered a UFE and has declined--she elects for definitive management\par \par We discussed the risks, benefits and alternatives of the procedure including, but not limited to: pain, bleeding, infection, risk of  laparotomy, risk of damage of structures surrounding the uterus including but not limited to the ovaries, bowel, bladder ureters, nerves, and vessels. The chance of deep vein thromboses and blood transfusion was also discussed. The patient expressed understanding of the risks, benefits and alternatives of the procedure and was able to explain them in her own words.\par \par She understands that full recovery will not take place until 8 weeks after surgery, and until that time she is to avoid any heavy lifting (no more than 10 pounds) exercise, or anything in the vagina (including fingers, toys, sex, or tub/pool/beach bathing)\par \par She may return to work after 2-3 weeks if she can follow the above requirements.\par

## 2022-09-07 ENCOUNTER — APPOINTMENT (OUTPATIENT)
Dept: INTERNAL MEDICINE | Facility: CLINIC | Age: 52
End: 2022-09-07

## 2022-09-07 DIAGNOSIS — F32.A DEPRESSION, UNSPECIFIED: ICD-10-CM

## 2022-09-07 PROCEDURE — 99214 OFFICE O/P EST MOD 30 MIN: CPT | Mod: 95

## 2022-09-13 ENCOUNTER — APPOINTMENT (OUTPATIENT)
Dept: INTERNAL MEDICINE | Facility: CLINIC | Age: 52
End: 2022-09-13

## 2022-09-13 VITALS
DIASTOLIC BLOOD PRESSURE: 66 MMHG | BODY MASS INDEX: 28.47 KG/M2 | HEIGHT: 60 IN | WEIGHT: 145 LBS | HEART RATE: 75 BPM | TEMPERATURE: 98.3 F | OXYGEN SATURATION: 98 % | SYSTOLIC BLOOD PRESSURE: 124 MMHG

## 2022-09-13 DIAGNOSIS — J45.909 UNSPECIFIED ASTHMA, UNCOMPLICATED: ICD-10-CM

## 2022-09-13 PROCEDURE — 99213 OFFICE O/P EST LOW 20 MIN: CPT | Mod: 25

## 2022-09-13 PROCEDURE — 36415 COLL VENOUS BLD VENIPUNCTURE: CPT

## 2022-09-16 NOTE — HISTORY OF PRESENT ILLNESS
[FreeTextEntry1] : T2DM [de-identified] : 51yo F with T2DM seen for follow\par remains on ozempic for the past 3 months or so\par no side effects from the medication\par interested in getting labs repeated\par \par ALso, pt has been sober for about 2 years\par cont to have visits with Dr.. Lopez\par \par Remainder of ROS reviewed and found to be negative.\par

## 2022-09-16 NOTE — ASSESSMENT
[FreeTextEntry1] : 53yo F seen for T2DM seen for folow up\par \par T2DM - on metformin and ozempic, send A1c\par            plan to order ophthalmology referral and mail to pt's home\par \par reactive airway disease - stable

## 2022-09-16 NOTE — PHYSICAL EXAM
[No Acute Distress] : no acute distress [Well Nourished] : well nourished [PERRL] : pupils equal round and reactive to light [Normal Oropharynx] : the oropharynx was normal [Normal Insight/Judgement] : insight and judgment were intact

## 2022-09-18 ENCOUNTER — RX RENEWAL (OUTPATIENT)
Age: 52
End: 2022-09-18

## 2022-09-22 LAB
HCG UR QL: NEGATIVE
QUALITY CONTROL: YES

## 2022-09-26 ENCOUNTER — OUTPATIENT (OUTPATIENT)
Dept: OUTPATIENT SERVICES | Facility: HOSPITAL | Age: 52
LOS: 1 days | End: 2022-09-26
Payer: COMMERCIAL

## 2022-09-26 VITALS
DIASTOLIC BLOOD PRESSURE: 66 MMHG | WEIGHT: 146.83 LBS | SYSTOLIC BLOOD PRESSURE: 134 MMHG | TEMPERATURE: 97 F | OXYGEN SATURATION: 97 % | HEART RATE: 73 BPM | RESPIRATION RATE: 18 BRPM | HEIGHT: 64 IN

## 2022-09-26 DIAGNOSIS — N92.0 EXCESSIVE AND FREQUENT MENSTRUATION WITH REGULAR CYCLE: ICD-10-CM

## 2022-09-26 DIAGNOSIS — Z98.890 OTHER SPECIFIED POSTPROCEDURAL STATES: Chronic | ICD-10-CM

## 2022-09-26 DIAGNOSIS — E11.9 TYPE 2 DIABETES MELLITUS WITHOUT COMPLICATIONS: ICD-10-CM

## 2022-09-26 DIAGNOSIS — E03.9 HYPOTHYROIDISM, UNSPECIFIED: ICD-10-CM

## 2022-09-26 DIAGNOSIS — Z01.818 ENCOUNTER FOR OTHER PREPROCEDURAL EXAMINATION: ICD-10-CM

## 2022-09-26 LAB
ANION GAP SERPL CALC-SCNC: 4 MMOL/L — LOW (ref 5–17)
BLD GP AB SCN SERPL QL: SIGNIFICANT CHANGE UP
BUN SERPL-MCNC: 16 MG/DL — SIGNIFICANT CHANGE UP (ref 7–23)
CALCIUM SERPL-MCNC: 9.3 MG/DL — SIGNIFICANT CHANGE UP (ref 8.4–10.5)
CHLORIDE SERPL-SCNC: 102 MMOL/L — SIGNIFICANT CHANGE UP (ref 96–108)
CO2 SERPL-SCNC: 32 MMOL/L — HIGH (ref 22–31)
CREAT SERPL-MCNC: 0.8 MG/DL — SIGNIFICANT CHANGE UP (ref 0.5–1.3)
EGFR: 89 ML/MIN/1.73M2 — SIGNIFICANT CHANGE UP
GLUCOSE SERPL-MCNC: 89 MG/DL — SIGNIFICANT CHANGE UP (ref 70–99)
HCT VFR BLD CALC: 41.7 % — SIGNIFICANT CHANGE UP (ref 34.5–45)
HGB BLD-MCNC: 13.9 G/DL — SIGNIFICANT CHANGE UP (ref 11.5–15.5)
MCHC RBC-ENTMCNC: 28.8 PG — SIGNIFICANT CHANGE UP (ref 27–34)
MCHC RBC-ENTMCNC: 33.3 GM/DL — SIGNIFICANT CHANGE UP (ref 32–36)
MCV RBC AUTO: 86.5 FL — SIGNIFICANT CHANGE UP (ref 80–100)
NRBC # BLD: 0 /100 WBCS — SIGNIFICANT CHANGE UP (ref 0–0)
PLATELET # BLD AUTO: 310 K/UL — SIGNIFICANT CHANGE UP (ref 150–400)
POTASSIUM SERPL-MCNC: 4.2 MMOL/L — SIGNIFICANT CHANGE UP (ref 3.5–5.3)
POTASSIUM SERPL-SCNC: 4.2 MMOL/L — SIGNIFICANT CHANGE UP (ref 3.5–5.3)
RBC # BLD: 4.82 M/UL — SIGNIFICANT CHANGE UP (ref 3.8–5.2)
RBC # FLD: 13 % — SIGNIFICANT CHANGE UP (ref 10.3–14.5)
SODIUM SERPL-SCNC: 138 MMOL/L — SIGNIFICANT CHANGE UP (ref 135–145)
WBC # BLD: 7.5 K/UL — SIGNIFICANT CHANGE UP (ref 3.8–10.5)
WBC # FLD AUTO: 7.5 K/UL — SIGNIFICANT CHANGE UP (ref 3.8–10.5)

## 2022-09-26 PROCEDURE — 93010 ELECTROCARDIOGRAM REPORT: CPT | Mod: NC

## 2022-09-26 NOTE — H&P PST ADULT - NSANTHOSAYNRD_GEN_A_CORE
neck 16 inches/No. KEYA screening performed.  STOP BANG Legend: 0-2 = LOW Risk; 3-4 = INTERMEDIATE Risk; 5-8 = HIGH Risk

## 2022-09-26 NOTE — H&P PST ADULT - PROBLEM SELECTOR PLAN 1
Scheduled for total abdominal hysterectomy bilateral salpingo-oophorectomy with Dr Wright on 10/03/2022.  COVID-19 testing information provided Pre op instructions given and patient verbalized understanding.  CBC, BMP, EKG, UA, T&S, hgb A1C  pending.  NPO after midnight night before procedure.  may take levothyroxine and singular AM of procedure with small sip of water. To stop all ASA, NSAIDs, vitamins and supplements 1 week prior to procedure.  Chlorhexidine wash given with instructions.  UCG AM of procedure

## 2022-09-26 NOTE — H&P PST ADULT - NSICDXFAMILYHX_GEN_ALL_CORE_FT
FAMILY HISTORY:  Father  Still living? Unknown  FH: brain aneurysm, Age at diagnosis: Age Unknown  FH: non-Hodgkin's lymphoma, Age at diagnosis: Age Unknown  FH: type 2 diabetes, Age at diagnosis: Age Unknown    Mother  Still living? Unknown  Family history of fibromyalgia, Age at diagnosis: Age Unknown  FH: heart disease, Age at diagnosis: Age Unknown  FH: hypothyroidism, Age at diagnosis: Age Unknown

## 2022-09-26 NOTE — H&P PST ADULT - FALL HARM RISK - UNIVERSAL INTERVENTIONS
Bed in lowest position, wheels locked, appropriate side rails in place/Call bell, personal items and telephone in reach/Instruct patient to call for assistance before getting out of bed or chair/Non-slip footwear when patient is out of bed/[place] to call system/Physically safe environment - no spills, clutter or unnecessary equipment/Purposeful Proactive Rounding/Room/bathroom lighting operational, light cord in reach

## 2022-09-26 NOTE — H&P PST ADULT - PROBLEM SELECTOR PLAN 3
2/7/2020      Melba Fieldsdima  [address] [facility] [address]        Dear Ms. Vazquez,    Please carefully read through the enclosed prep instructions at least 7 days prior to your procedure with Dr. John Tanner.  If you have questions regarding the instructions, please call [phone] and ask to speak with the nurse.    Date:  March 18, 2020  Procedure Time:  Someone from the hospital will call you 3-5 business days prior with your procedure and arrival times. If you have not received a call from the Pre-admission Testing nurse within 3 business days of your scheduled procedure, please call [phone] for your time of procedure and pre-procedure instructions.   Location:    [place] [address]  [phone]      If you need to cancel or reschedule your procedure, please contact the  at the number listed below.    Thank you,    Nasrin [phone]  Surgery Scheduler  Pre-Admit Department                                  COLONOSCOPY WITH SPLIT DOSING OF NULYTELY   Pre-Procedure Instructions      Please read these instructions thoroughly at least 2 weeks before your procedure. If you have any questions about these instructions, please call your physician's office at [phone].    THINGS TO DO BEFORE YOUR COLONOSCOPY:     TWO (2) WEEKS BEFORE:      Please  your Prep Kit at the pharmacy your physician sent the prescription to.. Do not mix the solution until the day before the procedure. Bring these instructions with you when you go to the pharmacy.    If you are taking the diet drug, Phentermine, you must stop taking this drug 14 days prior to the procedure.    SEVEN (7) DAYS BEFORE:    1. Do not take any Plavix, Brillinta, Effient, Aggrenox, Pepto-Bismol or iron supplements. If you have any questions or concerns about holding any of these medications, please contact your cardiologist or primary care physician.    2. Do  not eat popcorn, seeds, nuts or whole kernel corn.      FIVE (5) DAYS BEFORE:     Do not eat products with [place] (a fat substitute found in [employer] and Pringles Fat-Free chips) for 5 days before the procedure.      THREE (3) DAYS BEFORE:    1. Do not take any Coumadin (Warfarin) or Pradaxa for 3 days before the procedure. Please contact your cardiologist or primary care physician for any questions or concerns regarding holding this medication.     2. Please contact your primary care physician if you take insulin for diabetes.    3. Purchase clear liquids (without red or purple coloring) to have available to drink or eat the day before your colonoscopy.      CLEAR LIQUIDS INCLUDE:     · Water  · Fruit Juices (without pulp), such as apple or white grape  · Coffee or Tea (without creamer)  · Gatorade/Clear Sports Drinks  · Popsicles  · Carbonated or Non-carbonated Soft Drinks  · Joni-aid (or other flavored drinks)  · Plain Jell-O (without fruit or toppings)  · Hard Candy   · Broth.         *Avoid orange, pineapple, or prune juices*                                3    4. Make arrangements for someone to drive you home after the procedure because you will be very drowsy. Please make these arrangements in advance. A taxi is not an acceptable form of transportation. If you do not have transportation arranged, we will not be able to do the colonoscopy.    5. Make arrangements for someone to stay with you or check in on you frequently the rest of the day/evening until the drowsiness has completely worn off.    Due to everyone's busy schedules, it is important that you keep your appointment.. If you must reschedule or cancel your appointment, please notify your physician's office at least 48 hours in advance.      TWO (2) DAYS BEFORE:    1. Do not eat high fiber foods such as: fresh fruits, fresh vegetables, onions, foods with seeds, and bran products.    2. Do not eat corn-based foods for 48 hours before the  procedure.     3. Between the hours of 5:00 pm and 10:00 pm, drink a minimum of six 8 ounce glasses of water or clear liquids. The more clear liquids you drink, the better off you will be.      ONE (1) DAY BEFORE:    1. Drink or eat clear liquids only (without red or purple coloring). Clear liquids are liquids that you can see through. You may not have any solid foods or dairy products. You may not eat or drink anything that is red or purple in color.      CLEAR LIQUIDS INCLUDE:     · Water  · Fruit Juices (without pulp) such as apple or white grape  · Coffee or Tea (without creamer)  · Gatorade/Clear Sports Drinks  · Popsicles  · Carbonated or Non-carbonated Soft Drinks  · Joni-aid (or other flavored drinks)  · Plain Jell-O (without fruit or toppings)  · Hard Candy   · Broth     *Avoid orange, pineapple, or prune juices*    2. Do not drink any alcoholic beverages for at least 24 hours before the procedure.    3. Do not take Lomotil, Imodium, Effer-syllium, Metamucil, Citrucel, Konsyl, Hydrocil, or FiberCon.    4. In the morning, mix the Nulytely prep with the flavor packet and one gallon of water, shake well to mix, and refrigerate to chill. Do not further dilute the prep in any way.                                        4    5. At 5:00 pm, start drinking  the prep as follows:     · Drink one large (8-10 oz.) glass every 10-15 minutes. In most cases, loose and/or liquid bowel movements will begin within 30 minutes to one hour. You should remain within easy access of a bathroom.     · Continue to drink the prep regardless of stool frequency.     · Drinking until 1/2 of the gallon is gone.    · Refrigerate the remaining 1/2 gallon, as you will need to drink this later.     Note: If you experience rectal irritation due to frequent stooling, you may apply Desitin, Balmex, A & D   Ointment, or a similar product.    6. Continue to drink clear liquids throughout the evening.     Note: Your physician recommends drinking  at least 64 ounces of Gatorade. If you are a diabetic, please do not drink Gatorade since it is made with sugar. An alternative is Powerade Zero which can be bought at the same locations as Gatorade.      If your procedure is scheduled before 9 am:    1. At 9:00 pm start drinking the second half of Nulyte. Drink one (8-10 oz) glass of the Nulyte prep every 10 - 15 minutes until all of the solution has been consumed.    2. Your stools should have a clear (see-through), cloudy, yellow appearance with no formed substance. If your stools are darker or have formed substance, please call the location where your procedure is scheduled to be done and ask for the pre-op nurse, who will get further instructions from your physician.    3. Your physician recommends drinking at least 64 ounces of Gatorade. If you are a diabetic, please do not drink Gatorade since it is made with sugar. An alternative is Powerade Zero which can be bought at the same locations as Gatorade.    4. Do not eat or drink anything after midnight.      If your procedure is scheduled after 9 am:     1. Continue to drink clear liquids.    2. Your physician recommends drinking at least 64 ounces of Gatorade. If you are a diabetic, please do not drink Gatorade since it is made with sugar. An alternative is Powerade Zero which can be bought at the same locations as Gatorade.    3. Do not eat or drink anything after midnight. However, you will only drink the remaining 1/2 gallon of Nulyte prep, the morning of your colonoscopy as directed below.                                            5                                                        DAY OF YOUR COLONOSCOPY:    Take your heart and/or blood pressure medications with a small sip of water. Do not take any other medications until after the procedure.       If your procedure is scheduled before 9 am:    1. You may take your heart and/or blood pressure medications with a small sip of water.     2. Do not eat  or drink anything else until after the procedure.      If your procedure is scheduled after 9 am:    1. You may take your heart and/or blood pressure medications with a sip of water.     2. 4 hours prior to your procedure, start drinking the remaining half gallon of Nulyte prep. Drink one large (8-10 oz.) glass every 10-15 minutes. You should remain within easy access of a bathroom. Continue to drink the prep regardless of stool frequency.    3. Your stools should have a clear (see-through), cloudy, yellow appearance with no formed substance. If your stools are darker or have formed substance, please call the location where your procedure is scheduled to be done and ask for the pre-op nurse, who will get further instructions from your physician.    4. Take nothing by mouth 2 hours prior to your procedure.      AFTER YOUR COLONOSCOPY:    You will receive after-procedure information and instructions.     Do not plan on going to work or doing anything for the rest of the day.    You may resume eating and drinking with no limitations following the procedure.    Please call your physician's office at [phone] if you have any further questions or if you need additional care.    Thank you for entrusting your care to [facility].    Takes medications as prescribed

## 2022-09-26 NOTE — H&P PST ADULT - HISTORY OF PRESENT ILLNESS
53 y/o female presents for PST.  C/O heavy menstrual cycle caused by uterine fibroids.  Was taking Lupron injection  (last does 9/6/2022) to manage symptoms but now recommended for surgery .  Feeling well at [facility] today.  Scheduled for total abdominal hysterectomy bilateral salpingo-oophorectomy with Dr Wright on 10/03/2022.  COVId-19 testing information provided  51 y/o female with PMH of hypothyroidism, DM ( type 2) and depression presents for PST.  C/O heavy menstrual cycle caused by uterine fibroids.  Was taking Lupron injection  (last does 9/6/2022) to manage symptoms but now recommended for surgery .  Feeling well at PST today with no recent cough, fever or illness.  Scheduled for total abdominal hysterectomy bilateral salpingo-oophorectomy with Dr Wright on 10/03/2022.  COVID-19 testing information provided

## 2022-09-27 LAB
APPEARANCE UR: CLEAR — SIGNIFICANT CHANGE UP
BACTERIA # UR AUTO: NEGATIVE /HPF — SIGNIFICANT CHANGE UP
BILIRUB UR-MCNC: NEGATIVE — SIGNIFICANT CHANGE UP
COLOR SPEC: YELLOW — SIGNIFICANT CHANGE UP
DIFF PNL FLD: NEGATIVE — SIGNIFICANT CHANGE UP
EPI CELLS # UR: SIGNIFICANT CHANGE UP
GLUCOSE UR QL: NEGATIVE — SIGNIFICANT CHANGE UP
KETONES UR-MCNC: NEGATIVE — SIGNIFICANT CHANGE UP
LEUKOCYTE ESTERASE UR-ACNC: ABNORMAL
NITRITE UR-MCNC: NEGATIVE — SIGNIFICANT CHANGE UP
PH UR: 6 — SIGNIFICANT CHANGE UP (ref 5–8)
PROT UR-MCNC: NEGATIVE — SIGNIFICANT CHANGE UP
RBC CASTS # UR COMP ASSIST: NEGATIVE /HPF — SIGNIFICANT CHANGE UP (ref 0–4)
SP GR SPEC: 1.01 — SIGNIFICANT CHANGE UP (ref 1.01–1.02)
UROBILINOGEN FLD QL: NEGATIVE — SIGNIFICANT CHANGE UP
WBC UR QL: SIGNIFICANT CHANGE UP /HPF (ref 0–5)

## 2022-09-27 PROCEDURE — 93010 ELECTROCARDIOGRAM REPORT: CPT

## 2022-09-27 PROCEDURE — G0463: CPT

## 2022-09-27 PROCEDURE — 93005 ELECTROCARDIOGRAM TRACING: CPT

## 2022-09-27 PROCEDURE — 81001 URINALYSIS AUTO W/SCOPE: CPT

## 2022-09-27 PROCEDURE — 36415 COLL VENOUS BLD VENIPUNCTURE: CPT

## 2022-09-27 PROCEDURE — 86850 RBC ANTIBODY SCREEN: CPT

## 2022-09-27 PROCEDURE — 80048 BASIC METABOLIC PNL TOTAL CA: CPT

## 2022-09-27 PROCEDURE — 86900 BLOOD TYPING SEROLOGIC ABO: CPT

## 2022-09-27 PROCEDURE — 87086 URINE CULTURE/COLONY COUNT: CPT

## 2022-09-27 PROCEDURE — 85027 COMPLETE CBC AUTOMATED: CPT

## 2022-09-27 PROCEDURE — 86901 BLOOD TYPING SEROLOGIC RH(D): CPT

## 2022-09-28 PROBLEM — Z86.59 PERSONAL HISTORY OF OTHER MENTAL AND BEHAVIORAL DISORDERS: Chronic | Status: ACTIVE | Noted: 2022-09-26

## 2022-09-28 PROBLEM — E03.9 HYPOTHYROIDISM, UNSPECIFIED: Chronic | Status: ACTIVE | Noted: 2022-09-26

## 2022-09-28 PROBLEM — J30.2 OTHER SEASONAL ALLERGIC RHINITIS: Chronic | Status: ACTIVE | Noted: 2022-09-26

## 2022-09-28 PROBLEM — Z86.16 PERSONAL HISTORY OF COVID-19: Chronic | Status: ACTIVE | Noted: 2022-09-26

## 2022-09-28 PROBLEM — E11.9 TYPE 2 DIABETES MELLITUS WITHOUT COMPLICATIONS: Chronic | Status: ACTIVE | Noted: 2022-09-26

## 2022-09-28 PROBLEM — N92.0 EXCESSIVE AND FREQUENT MENSTRUATION WITH REGULAR CYCLE: Chronic | Status: ACTIVE | Noted: 2022-09-26

## 2022-09-28 LAB
CULTURE RESULTS: SIGNIFICANT CHANGE UP
SPECIMEN SOURCE: SIGNIFICANT CHANGE UP

## 2022-09-29 ENCOUNTER — APPOINTMENT (OUTPATIENT)
Dept: INTERNAL MEDICINE | Facility: CLINIC | Age: 52
End: 2022-09-29

## 2022-09-29 VITALS
HEART RATE: 79 BPM | SYSTOLIC BLOOD PRESSURE: 128 MMHG | BODY MASS INDEX: 28.86 KG/M2 | HEIGHT: 60 IN | DIASTOLIC BLOOD PRESSURE: 66 MMHG | OXYGEN SATURATION: 98 % | WEIGHT: 147 LBS

## 2022-09-29 DIAGNOSIS — D25.9 LEIOMYOMA OF UTERUS, UNSPECIFIED: ICD-10-CM

## 2022-09-29 DIAGNOSIS — Z01.818 ENCOUNTER FOR OTHER PREPROCEDURAL EXAMINATION: ICD-10-CM

## 2022-09-29 PROCEDURE — 99214 OFFICE O/P EST MOD 30 MIN: CPT

## 2022-09-29 RX ORDER — CLINDAMYCIN PHOSPHATE 1 G/10ML
1 GEL TOPICAL TWICE DAILY
Qty: 1 | Refills: 0 | Status: DISCONTINUED | COMMUNITY
Start: 2020-11-27 | End: 2022-09-29

## 2022-09-29 RX ORDER — MELOXICAM 15 MG/1
15 TABLET ORAL DAILY
Qty: 90 | Refills: 3 | Status: DISCONTINUED | COMMUNITY
Start: 2020-08-04 | End: 2022-09-29

## 2022-09-29 RX ORDER — FAMOTIDINE 20 MG/1
20 TABLET, FILM COATED ORAL
Qty: 28 | Refills: 0 | Status: DISCONTINUED | COMMUNITY
Start: 2022-01-13 | End: 2022-09-29

## 2022-09-29 RX ORDER — CLINDAMYCIN PHOSPHATE 10 MG/ML
1 SOLUTION TOPICAL
Qty: 3 | Refills: 1 | Status: DISCONTINUED | COMMUNITY
Start: 2020-12-10 | End: 2022-09-29

## 2022-09-29 RX ORDER — CLINDAMYCIN PHOSPHATE 10 MG/ML
1 LOTION TOPICAL
Qty: 1 | Refills: 5 | Status: DISCONTINUED | COMMUNITY
Start: 2020-11-23 | End: 2022-09-29

## 2022-09-29 RX ORDER — DICLOFENAC SODIUM 1% 10 MG/G
1 GEL TOPICAL
Qty: 1 | Refills: 3 | Status: DISCONTINUED | COMMUNITY
Start: 2021-01-20 | End: 2022-09-29

## 2022-09-29 NOTE — PHYSICAL EXAM
[No Acute Distress] : no acute distress [Well-Appearing] : well-appearing [Normal Sclera/Conjunctiva] : normal sclera/conjunctiva [EOMI] : extraocular movements intact [Normal Outer Ear/Nose] : the outer ears and nose were normal in appearance [Normal Oropharynx] : the oropharynx was normal [No JVD] : no jugular venous distention [No Lymphadenopathy] : no lymphadenopathy [Supple] : supple [No Respiratory Distress] : no respiratory distress  [No Accessory Muscle Use] : no accessory muscle use [Clear to Auscultation] : lungs were clear to auscultation bilaterally [Normal Rate] : normal rate  [Regular Rhythm] : with a regular rhythm [Normal S1, S2] : normal S1 and S2 [No Edema] : there was no peripheral edema [Soft] : abdomen soft [Non Tender] : non-tender [Non-distended] : non-distended [No HSM] : no HSM [Normal Anterior Cervical Nodes] : no anterior cervical lymphadenopathy [No CVA Tenderness] : no CVA  tenderness [No Spinal Tenderness] : no spinal tenderness [No Joint Swelling] : no joint swelling [Grossly Normal Strength/Tone] : grossly normal strength/tone [No Rash] : no rash [Coordination Grossly Intact] : coordination grossly intact [No Focal Deficits] : no focal deficits [Normal Gait] : normal gait [Speech Grossly Normal] : speech grossly normal [Normal Affect] : the affect was normal [Normal Mood] : the mood was normal [Normal Insight/Judgement] : insight and judgment were intact

## 2022-09-29 NOTE — HISTORY OF PRESENT ILLNESS
[No Pertinent Cardiac History] : no history of aortic stenosis, atrial fibrillation, coronary artery disease, recent myocardial infarction, or implantable device/pacemaker [Diabetes] : diabetes [No Pertinent Pulmonary History] : no history of asthma, COPD, sleep apnea, or smoking [(Patient denies any chest pain, claudication, dyspnea on exertion, orthopnea, palpitations or syncope)] : Patient denies any chest pain, claudication, dyspnea on exertion, orthopnea, palpitations or syncope [No Adverse Anesthesia Reaction] : no adverse anesthesia reaction in self or family member [Good (7-10 METs)] : Good (7-10 METs) [Chronic Anticoagulation] : no chronic anticoagulation [Chronic Kidney Disease] : no chronic kidney disease [FreeTextEntry1] : Total abdominal hysterectomy w/ bilateral salpino-oophorectomy [FreeTextEntry2] : 10/3/22 [FreeTextEntry3] : Dr. Nano Wright [FreeTextEntry4] : 52F PMH DM2, (A1c 6.1% on 9/13/22) hypothyroidism, depression, arthritis, alcohol use d/o in remission, myomatous uterus who presents for pre-op evaluation.\par \par She went to pre-op testing and had lab work performed (CBC, CMP, urinalysis + micro), as well as 2 EKGs for suspected abnormality w/o comparison to prior.\par \par She denies any cardiac symptoms, feels as well as she has at any point in her life, BP has been well controlled. She has hx of alcohol abuse and has been off alcohol for 2 years. \par \par Little formal exercise but states that she is very active during day, constantly on her feet, up and down the stairs, etc. \par \par Pre-surgical fax#: [phone]

## 2022-09-29 NOTE — ASSESSMENT
[High Risk Surgery - Intraperitoneal, Intrathoracic or Supringuinal Vascular Procedures] : High Risk Surgery - Intraperitoneal, Intrathoracic or Supringuinal Vascular Procedures - No (0) [Ischemic Heart Disease] : Ischemic Heart Disease - No (0) [Congestive Heart Failure] : Congestive Heart Failure - No (0) [Prior Cerebrovascular Accident or TIA] : Prior Cerebrovascular Accident or TIA - No (0) [Creatinine >= 2mg/dL (1 Point)] : Creatinine >= 2mg/dL - No (0) [Insulin-dependent Diabetic (1 Point)] : Insulin-dependent Diabetic - No (0) [0] : 0 , RCRI Class: I, Risk of Post-Op Cardiac Complications: 3.9%, 95% CI for Risk Estimate: 2.8% - 5.4% [Patient Optimized for Surgery] : Patient optimized for surgery [No Further Testing Recommended] : no further testing recommended [As per surgery] : as per surgery [FreeTextEntry4] : 52F PMH DM2, (A1c 6.1% on 9/13/22) hypothyroidism, depression, arthritis, alcohol use d/o in remission, myomatous uterus who presents for pre-op evaluation.\par \par She is RCRI class I and low risk for a moderate risk procedure. Labs reviewed, unremarkable. EKGs reviewed, and past EKGs, do not suspect LVH or significant change. Patient w/o cardiac symptoms and good functional capacity.\par Patient is due for Ozempic dose the day of the surgery, advised to hold that day, may resume after once she has had adequate bowel movement, discussed resuming at 0.25 mg/wk for first 1-2 weeks to minimize side effect risks. \par Patient is on Naltrexone for hx alcohol abuse, please note for anaesthesia purposes, she was advised to hold medication 48+ hours prior to procedure and subsequent to the procedure until she no longer requires use of opiate pain medication. Patient will also speak w/prescribing physician. \par C/w other medications as prescribed.

## 2022-09-30 LAB — SARS-COV-2 N GENE NPH QL NAA+PROBE: NOT DETECTED

## 2022-10-02 ENCOUNTER — TRANSCRIPTION ENCOUNTER (OUTPATIENT)
Age: 52
End: 2022-10-02

## 2022-10-02 LAB
25(OH)D3 SERPL-MCNC: 59 NG/ML
ESTIMATED AVERAGE GLUCOSE: 128 MG/DL
FERRITIN SERPL-MCNC: 46 NG/ML
HBA1C MFR BLD HPLC: 6.1 %
IRON SATN MFR SERPL: 34 %
IRON SERPL-MCNC: 101 UG/DL
T4 FREE SERPL-MCNC: 1.6 NG/DL
TIBC SERPL-MCNC: 295 UG/DL
TSH SERPL-ACNC: 1.17 UIU/ML
UIBC SERPL-MCNC: 194 UG/DL

## 2022-10-03 ENCOUNTER — RESULT REVIEW (OUTPATIENT)
Age: 52
End: 2022-10-03

## 2022-10-03 ENCOUNTER — INPATIENT (INPATIENT)
Facility: HOSPITAL | Age: 52
LOS: 0 days | Discharge: HOME | DRG: 743 | End: 2022-10-04
Attending: OBSTETRICS & GYNECOLOGY | Admitting: OBSTETRICS & GYNECOLOGY
Payer: COMMERCIAL

## 2022-10-03 ENCOUNTER — APPOINTMENT (OUTPATIENT)
Dept: OBGYN | Facility: HOSPITAL | Age: 52
End: 2022-10-03

## 2022-10-03 VITALS
TEMPERATURE: 98 F | SYSTOLIC BLOOD PRESSURE: 120 MMHG | OXYGEN SATURATION: 97 % | HEIGHT: 64 IN | DIASTOLIC BLOOD PRESSURE: 77 MMHG | HEART RATE: 73 BPM | WEIGHT: 146.83 LBS | RESPIRATION RATE: 14 BRPM

## 2022-10-03 DIAGNOSIS — Z98.890 OTHER SPECIFIED POSTPROCEDURAL STATES: Chronic | ICD-10-CM

## 2022-10-03 DIAGNOSIS — D25.9 LEIOMYOMA OF UTERUS, UNSPECIFIED: ICD-10-CM

## 2022-10-03 DIAGNOSIS — Z90.89 ACQUIRED ABSENCE OF OTHER ORGANS: Chronic | ICD-10-CM

## 2022-10-03 DIAGNOSIS — N92.0 EXCESSIVE AND FREQUENT MENSTRUATION WITH REGULAR CYCLE: ICD-10-CM

## 2022-10-03 LAB
ABO RH CONFIRMATION: SIGNIFICANT CHANGE UP
GLUCOSE BLDC GLUCOMTR-MCNC: 101 MG/DL — HIGH (ref 70–99)
GLUCOSE BLDC GLUCOMTR-MCNC: 174 MG/DL — HIGH (ref 70–99)

## 2022-10-03 PROCEDURE — 52000 CYSTOURETHROSCOPY: CPT | Mod: 59

## 2022-10-03 PROCEDURE — 58543 LSH UTERUS ABOVE 250 G: CPT

## 2022-10-03 PROCEDURE — 88307 TISSUE EXAM BY PATHOLOGIST: CPT | Mod: 26

## 2022-10-03 DEVICE — HEMOSTAT ARISTA 3GR
Type: IMPLANTABLE DEVICE | Status: NON-FUNCTIONAL
Removed: 2022-10-03

## 2022-10-03 DEVICE — SPONGE INTERCEED 3X4"
Type: IMPLANTABLE DEVICE | Status: NON-FUNCTIONAL
Removed: 2022-10-03

## 2022-10-03 RX ORDER — IBUPROFEN 200 MG
600 TABLET ORAL EVERY 6 HOURS
Refills: 0 | Status: DISCONTINUED | OUTPATIENT
Start: 2022-10-03 | End: 2022-10-04

## 2022-10-03 RX ORDER — SODIUM CHLORIDE 9 MG/ML
1000 INJECTION, SOLUTION INTRAVENOUS
Refills: 0 | Status: DISCONTINUED | OUTPATIENT
Start: 2022-10-03 | End: 2022-10-03

## 2022-10-03 RX ORDER — ACETAMINOPHEN 500 MG
975 TABLET ORAL EVERY 6 HOURS
Refills: 0 | Status: DISCONTINUED | OUTPATIENT
Start: 2022-10-03 | End: 2022-10-04

## 2022-10-03 RX ORDER — THIAMINE MONONITRATE (VIT B1) 100 MG
1 TABLET ORAL
Qty: 0 | Refills: 0 | DISCHARGE

## 2022-10-03 RX ORDER — OXYCODONE HYDROCHLORIDE 5 MG/1
5 TABLET ORAL ONCE
Refills: 0 | Status: DISCONTINUED | OUTPATIENT
Start: 2022-10-03 | End: 2022-10-04

## 2022-10-03 RX ORDER — SEMAGLUTIDE 0.68 MG/ML
0.5 INJECTION, SOLUTION SUBCUTANEOUS
Qty: 0 | Refills: 0 | DISCHARGE

## 2022-10-03 RX ORDER — MULTIVIT-MIN/FERROUS GLUCONATE 9 MG/15 ML
1 LIQUID (ML) ORAL
Qty: 0 | Refills: 0 | DISCHARGE

## 2022-10-03 RX ORDER — FOLIC ACID 0.8 MG
1 TABLET ORAL
Qty: 0 | Refills: 0 | DISCHARGE

## 2022-10-03 RX ORDER — NALTREXONE HYDROCHLORIDE 50 MG/1
1 TABLET, FILM COATED ORAL
Qty: 0 | Refills: 0 | DISCHARGE

## 2022-10-03 RX ORDER — INFLUENZA VIRUS VACCINE 15; 15; 15; 15 UG/.5ML; UG/.5ML; UG/.5ML; UG/.5ML
0.5 SUSPENSION INTRAMUSCULAR ONCE
Refills: 0 | Status: COMPLETED | OUTPATIENT
Start: 2022-10-03 | End: 2022-10-03

## 2022-10-03 RX ORDER — ONDANSETRON 8 MG/1
4 TABLET, FILM COATED ORAL EVERY 6 HOURS
Refills: 0 | Status: DISCONTINUED | OUTPATIENT
Start: 2022-10-03 | End: 2022-10-04

## 2022-10-03 RX ORDER — MONTELUKAST 4 MG/1
1 TABLET, CHEWABLE ORAL
Qty: 0 | Refills: 0 | DISCHARGE

## 2022-10-03 RX ORDER — LEVOTHYROXINE SODIUM 125 MCG
1 TABLET ORAL
Qty: 0 | Refills: 0 | DISCHARGE

## 2022-10-03 RX ORDER — HEPARIN SODIUM 5000 [USP'U]/ML
5000 INJECTION INTRAVENOUS; SUBCUTANEOUS EVERY 12 HOURS
Refills: 0 | Status: DISCONTINUED | OUTPATIENT
Start: 2022-10-04 | End: 2022-10-04

## 2022-10-03 RX ORDER — ONDANSETRON 8 MG/1
4 TABLET, FILM COATED ORAL ONCE
Refills: 0 | Status: DISCONTINUED | OUTPATIENT
Start: 2022-10-03 | End: 2022-10-03

## 2022-10-03 RX ORDER — FLUOXETINE HCL 10 MG
2 CAPSULE ORAL
Qty: 0 | Refills: 0 | DISCHARGE

## 2022-10-03 RX ORDER — ACETAMINOPHEN 500 MG
2 TABLET ORAL
Qty: 0 | Refills: 0 | DISCHARGE

## 2022-10-03 RX ORDER — HYDROXYZINE HCL 10 MG
0 TABLET ORAL
Qty: 0 | Refills: 0 | DISCHARGE

## 2022-10-03 RX ORDER — HYDROMORPHONE HYDROCHLORIDE 2 MG/ML
0.5 INJECTION INTRAMUSCULAR; INTRAVENOUS; SUBCUTANEOUS
Refills: 0 | Status: DISCONTINUED | OUTPATIENT
Start: 2022-10-03 | End: 2022-10-03

## 2022-10-03 RX ADMIN — Medication 600 MILLIGRAM(S): at 22:45

## 2022-10-03 RX ADMIN — SODIUM CHLORIDE 50 MILLILITER(S): 9 INJECTION, SOLUTION INTRAVENOUS at 11:45

## 2022-10-03 RX ADMIN — Medication 600 MILLIGRAM(S): at 22:06

## 2022-10-03 RX ADMIN — HYDROMORPHONE HYDROCHLORIDE 0.5 MILLIGRAM(S): 2 INJECTION INTRAMUSCULAR; INTRAVENOUS; SUBCUTANEOUS at 18:51

## 2022-10-03 NOTE — ASU DISCHARGE PLAN (ADULT/PEDIATRIC) - ACTIVITY LEVEL
No excercise/No heavy lifting No excercise/No heavy lifting/No sports/gym/Nothing per vagina/No douching/No tampons/No intercourse

## 2022-10-03 NOTE — ASU DISCHARGE PLAN (ADULT/PEDIATRIC) - CALL YOUR DOCTOR IF YOU HAVE ANY OF THE FOLLOWING:
Bleeding that does not stop/Swelling that gets worse/Pain not relieved by Medications Bleeding that does not stop/Swelling that gets worse/Pain not relieved by Medications/Fever greater than (need to indicate Fahrenheit or Celsius)/Wound/Surgical Site with redness, or foul smelling discharge or pus/Numbness, tingling, color or temperature change to extremity/Nausea and vomiting that does not stop/Unable to urinate

## 2022-10-03 NOTE — BRIEF OPERATIVE NOTE - NSICDXBRIEFPROCEDURE_GEN_ALL_CORE_FT
PROCEDURES:  Laparoscopic supracervical hysterectomy for uterus over 250 grams 03-Oct-2022 21:23:28  Nano Wright  Cystoscopy 03-Oct-2022 21:23:39  Nano Wright  Salpingoophorectomy, bilateral, laparoscopic , at same operative episode 03-Oct-2022 21:24:04  Nano Wright

## 2022-10-03 NOTE — ASU DISCHARGE PLAN (ADULT/PEDIATRIC) - CARE PROVIDER_API CALL
Nano Wright (MD; MS)  Obstetrics and Gynecology  [address]  [place] [address]  Phone: [phone]  Fax: [phone]  Follow Up Time:

## 2022-10-03 NOTE — PRE-OP CHECKLIST - SELECT TESTS ORDERED
UCG negative 10/3/22/UCG/POCT Blood Glucose/COVID-19 UCG negative 10/3/22, accucheck 101@1053am/UCG/POCT Blood Glucose/COVID-19

## 2022-10-03 NOTE — PATIENT PROFILE ADULT - FALL HARM RISK - UNIVERSAL INTERVENTIONS
Bed in lowest position, wheels locked, appropriate side rails in place/Call bell, personal items and telephone in reach/Instruct patient to call for assistance before getting out of bed or chair/Non-slip footwear when patient is out of bed/[place] to call system/Physically safe environment - no spills, clutter or unnecessary equipment/Purposeful Proactive Rounding/Room/bathroom lighting operational, light cord in reach

## 2022-10-03 NOTE — ASU DISCHARGE PLAN (ADULT/PEDIATRIC) - PROCEDURE
Laparoscopic supracervical hysterectomy Laparoscopic supracervical hysterectomy, bilateral salpingo-oophorectomies

## 2022-10-04 ENCOUNTER — TRANSCRIPTION ENCOUNTER (OUTPATIENT)
Age: 52
End: 2022-10-04

## 2022-10-04 ENCOUNTER — NON-APPOINTMENT (OUTPATIENT)
Age: 52
End: 2022-10-04

## 2022-10-04 VITALS
OXYGEN SATURATION: 95 % | HEART RATE: 95 BPM | DIASTOLIC BLOOD PRESSURE: 73 MMHG | RESPIRATION RATE: 18 BRPM | TEMPERATURE: 99 F | WEIGHT: 161.82 LBS | SYSTOLIC BLOOD PRESSURE: 121 MMHG

## 2022-10-04 LAB
ANION GAP SERPL CALC-SCNC: 9 MMOL/L — SIGNIFICANT CHANGE UP (ref 5–17)
BASOPHILS # BLD AUTO: 0.02 K/UL — SIGNIFICANT CHANGE UP (ref 0–0.2)
BASOPHILS NFR BLD AUTO: 0.1 % — SIGNIFICANT CHANGE UP (ref 0–2)
BUN SERPL-MCNC: 15 MG/DL — SIGNIFICANT CHANGE UP (ref 7–23)
CALCIUM SERPL-MCNC: 8.5 MG/DL — SIGNIFICANT CHANGE UP (ref 8.4–10.5)
CHLORIDE SERPL-SCNC: 102 MMOL/L — SIGNIFICANT CHANGE UP (ref 96–108)
CO2 SERPL-SCNC: 28 MMOL/L — SIGNIFICANT CHANGE UP (ref 22–31)
CREAT SERPL-MCNC: 0.91 MG/DL — SIGNIFICANT CHANGE UP (ref 0.5–1.3)
EGFR: 76 ML/MIN/1.73M2 — SIGNIFICANT CHANGE UP
EOSINOPHIL # BLD AUTO: 0 K/UL — SIGNIFICANT CHANGE UP (ref 0–0.5)
EOSINOPHIL NFR BLD AUTO: 0 % — SIGNIFICANT CHANGE UP (ref 0–6)
GLUCOSE SERPL-MCNC: 167 MG/DL — HIGH (ref 70–99)
HCT VFR BLD CALC: 38.1 % — SIGNIFICANT CHANGE UP (ref 34.5–45)
HGB BLD-MCNC: 12.6 G/DL — SIGNIFICANT CHANGE UP (ref 11.5–15.5)
IMM GRANULOCYTES NFR BLD AUTO: 0.7 % — SIGNIFICANT CHANGE UP (ref 0–0.9)
LYMPHOCYTES # BLD AUTO: 1.32 K/UL — SIGNIFICANT CHANGE UP (ref 1–3.3)
LYMPHOCYTES # BLD AUTO: 9.7 % — LOW (ref 13–44)
MCHC RBC-ENTMCNC: 28.4 PG — SIGNIFICANT CHANGE UP (ref 27–34)
MCHC RBC-ENTMCNC: 33.1 GM/DL — SIGNIFICANT CHANGE UP (ref 32–36)
MCV RBC AUTO: 86 FL — SIGNIFICANT CHANGE UP (ref 80–100)
MONOCYTES # BLD AUTO: 1.06 K/UL — HIGH (ref 0–0.9)
MONOCYTES NFR BLD AUTO: 7.8 % — SIGNIFICANT CHANGE UP (ref 2–14)
NEUTROPHILS # BLD AUTO: 11.13 K/UL — HIGH (ref 1.8–7.4)
NEUTROPHILS NFR BLD AUTO: 81.7 % — HIGH (ref 43–77)
NRBC # BLD: 0 /100 WBCS — SIGNIFICANT CHANGE UP (ref 0–0)
PLATELET # BLD AUTO: 307 K/UL — SIGNIFICANT CHANGE UP (ref 150–400)
POTASSIUM SERPL-MCNC: 3.9 MMOL/L — SIGNIFICANT CHANGE UP (ref 3.5–5.3)
POTASSIUM SERPL-SCNC: 3.9 MMOL/L — SIGNIFICANT CHANGE UP (ref 3.5–5.3)
RBC # BLD: 4.43 M/UL — SIGNIFICANT CHANGE UP (ref 3.8–5.2)
RBC # FLD: 13.2 % — SIGNIFICANT CHANGE UP (ref 10.3–14.5)
SODIUM SERPL-SCNC: 139 MMOL/L — SIGNIFICANT CHANGE UP (ref 135–145)
WBC # BLD: 13.63 K/UL — HIGH (ref 3.8–10.5)
WBC # FLD AUTO: 13.63 K/UL — HIGH (ref 3.8–10.5)

## 2022-10-04 PROCEDURE — 85025 COMPLETE CBC W/AUTO DIFF WBC: CPT

## 2022-10-04 PROCEDURE — C1765: CPT

## 2022-10-04 PROCEDURE — 82962 GLUCOSE BLOOD TEST: CPT

## 2022-10-04 PROCEDURE — C1889: CPT

## 2022-10-04 PROCEDURE — 80048 BASIC METABOLIC PNL TOTAL CA: CPT

## 2022-10-04 PROCEDURE — 88307 TISSUE EXAM BY PATHOLOGIST: CPT

## 2022-10-04 PROCEDURE — 36415 COLL VENOUS BLD VENIPUNCTURE: CPT

## 2022-10-04 PROCEDURE — C9399: CPT

## 2022-10-04 RX ORDER — LEVOTHYROXINE SODIUM 125 MCG
125 TABLET ORAL DAILY
Refills: 0 | Status: DISCONTINUED | OUTPATIENT
Start: 2022-10-04 | End: 2022-10-04

## 2022-10-04 RX ORDER — MONTELUKAST 4 MG/1
10 TABLET, CHEWABLE ORAL DAILY
Refills: 0 | Status: DISCONTINUED | OUTPATIENT
Start: 2022-10-04 | End: 2022-10-04

## 2022-10-04 RX ORDER — IBUPROFEN 200 MG
1 TABLET ORAL
Qty: 0 | Refills: 0 | DISCHARGE
Start: 2022-10-04

## 2022-10-04 RX ORDER — FLUOXETINE HCL 10 MG
80 CAPSULE ORAL DAILY
Refills: 0 | Status: DISCONTINUED | OUTPATIENT
Start: 2022-10-04 | End: 2022-10-04

## 2022-10-04 RX ORDER — THIAMINE MONONITRATE (VIT B1) 100 MG
100 TABLET ORAL DAILY
Refills: 0 | Status: DISCONTINUED | OUTPATIENT
Start: 2022-10-04 | End: 2022-10-04

## 2022-10-04 RX ADMIN — Medication 975 MILLIGRAM(S): at 05:40

## 2022-10-04 RX ADMIN — Medication 80 MILLIGRAM(S): at 05:48

## 2022-10-04 RX ADMIN — Medication 125 MICROGRAM(S): at 05:49

## 2022-10-04 RX ADMIN — Medication 600 MILLIGRAM(S): at 11:09

## 2022-10-04 RX ADMIN — HEPARIN SODIUM 5000 UNIT(S): 5000 INJECTION INTRAVENOUS; SUBCUTANEOUS at 05:03

## 2022-10-04 RX ADMIN — Medication 975 MILLIGRAM(S): at 05:03

## 2022-10-04 RX ADMIN — MONTELUKAST 10 MILLIGRAM(S): 4 TABLET, CHEWABLE ORAL at 05:48

## 2022-10-04 NOTE — DISCHARGE NOTE PROVIDER - NSDCFUSCHEDAPPT_GEN_ALL_CORE_FT
Nano Wright  [facility]  OBGYNGEN [address] [facility]  Scheduled Appointment: 11/16/2022    [facility]  INTMED [address] [place] [address]  Scheduled Appointment: 11/22/2022

## 2022-10-04 NOTE — DISCHARGE NOTE PROVIDER - HOSPITAL COURSE
53 yo Female s/p elective Laparoscopic supracervical hysterectomy, bilateral salpingo-oophorectomies 10/3/22. Patient tolerated surgery, no acute events.   Patient stayed overnight for observation and pain control. Patient discharged in stable condition on POD #1 once pain controlled, tolerating reg diet, ambulating and voiding without difficulty and cleared by GYN for discharge.     (Asked by Medical records to add "discharge note provider" to chart. ASU discharge was completed on day of discharge on 10/4/22)

## 2022-10-04 NOTE — DISCHARGE NOTE PROVIDER - NSDCMRMEDTOKEN_GEN_ALL_CORE_FT
Centrum Adults oral tablet: 1 tab(s) orally once a day  folic acid 1 mg oral tablet: 1 tab(s) orally once a day  hydrOXYzine pamoate 25 mg oral capsule: orally once a day (at bedtime)  ibuprofen 600 mg oral tablet: 1 tab(s) orally every 6 hours, As needed, Mild Pain (1 - 3)  levothyroxine 125 mcg (0.125 mg) oral tablet: 1 tab(s) orally once a day  naltrexone 50 mg oral tablet: 1 tab(s) orally once a day  Ozempic: 0.5 milligram(s) subcutaneous once a week  PROzac 40 mg oral capsule: 2 cap(s) orally once a day  Singulair 10 mg oral tablet: 1 tab(s) orally once a day  thiamine 100 mg oral tablet: 1 tab(s) orally once a day  Tylenol 500 mg oral tablet: 2 tab(s) orally every 6 hours  Vitamin B1 100 mg oral tablet: 1 tab(s) orally once a day

## 2022-10-04 NOTE — PROGRESS NOTE ADULT - SUBJECTIVE AND OBJECTIVE BOX
s/p laparoscopic hysterectomy with bso POD #1    Pt. seen and examined at bedside resting comfortably. C/o postoperative pain, well controlled overnight and this AM with use of Ibuprofen. Patient states she stayed last night due to severe gas pains which are better this AM. Denies N/V, tolerating diet. Voiding well. Ambulating without difficulty.   Denies fever/chills, chest pain, dyspnea, cough, dizziness.     Vital Signs Last 24 Hrs  T(C): 37.1 (04 Oct 2022 05:14), Max: 37.1 (03 Oct 2022 20:28)  T(F): 98.8 (04 Oct 2022 05:14), Max: 98.8 (04 Oct 2022 05:14)  HR: 95 (04 Oct 2022 05:14) (73 - 95)  BP: 121/73 (04 Oct 2022 05:14) (120/77 - 142/71)  RR: 18 (04 Oct 2022 05:14) (14 - 18)  SpO2: 95% (04 Oct 2022 05:14) (93% - 99%)    Parameters below as of 04 Oct 2022 05:14  Patient On (Oxygen Delivery Method): room air        PHYSICAL EXAM:    GENERAL: NAD  HEAD:  Atraumatic, Normocephalic  EYES: EOMI, PERRLA, conjunctiva and sclera clear  CHEST/LUNG: Clear to ausculation, bilaterally   HEART: S1S2  ABDOMEN: Dressings CDI. non distended, +BS, soft, appropriate incisional tenderness, no guarding  EXTREMITIES:  calf soft, non tender b/l. 2+ distal pulses b/l.     I&O's Detail    03 Oct 2022 07:01  -  04 Oct 2022 07:00  --------------------------------------------------------  IN:    Lactated Ringers: 85 mL  Total IN: 85 mL    OUT:  Total OUT: 0 mL    Total NET: 85 mL          LABS:                        12.6   13.63 )-----------( 307      ( 04 Oct 2022 06:30 )             38.1     10-04    139  |  102  |  15  ----------------------------<  167<H>  3.9   |  28  |  0.91    Ca    8.5      04 Oct 2022 06:30        A/P: 52F s/p laparoscopic hysterectomy with BSO POD #1, HD stable.    Discharge home, scripts sent. Patient to follow up in the office in 1 week.   Pain management PRN  local wound care  DVT ppx, OOB/ambulate, incentive spirometer   Discussed with Dr. Wright

## 2022-10-04 NOTE — DISCHARGE NOTE PROVIDER - CARE PROVIDER_API CALL
Nano Wright (MD; MS)  Obstetrics and Gynecology  [address] [facility] [address]  [place] [address]  Phone: [phone]  Fax: [phone]  Follow Up Time:

## 2022-10-04 NOTE — DISCHARGE NOTE PROVIDER - NSDCCPCAREPLAN_GEN_ALL_CORE_FT
PRINCIPAL DISCHARGE DIAGNOSIS  Diagnosis: Menorrhagia  Assessment and Plan of Treatment: S/p Laparoscopic supracervical hysterectomy

## 2022-10-04 NOTE — DISCHARGE NOTE NURSING/CASE MANAGEMENT/SOCIAL WORK - PATIENT PORTAL LINK FT
You can access the FollowMyHealth Patient Portal offered by [facility] by registering at the following website: [email or web address]. By joining [facility]’s FollowMyHealth portal, you will also be able to view your health information using other applications (apps) compatible with our system.

## 2022-10-07 LAB — SURGICAL PATHOLOGY STUDY: SIGNIFICANT CHANGE UP

## 2022-10-11 ENCOUNTER — NON-APPOINTMENT (OUTPATIENT)
Age: 52
End: 2022-10-11

## 2022-10-11 ENCOUNTER — APPOINTMENT (OUTPATIENT)
Dept: OPHTHALMOLOGY | Facility: CLINIC | Age: 52
End: 2022-10-11

## 2022-10-11 PROCEDURE — 92012 INTRM OPH EXAM EST PATIENT: CPT

## 2022-10-12 ENCOUNTER — RX RENEWAL (OUTPATIENT)
Age: 52
End: 2022-10-12

## 2022-10-14 ENCOUNTER — TRANSCRIPTION ENCOUNTER (OUTPATIENT)
Age: 52
End: 2022-10-14

## 2022-10-17 ENCOUNTER — TRANSCRIPTION ENCOUNTER (OUTPATIENT)
Age: 52
End: 2022-10-17

## 2022-10-18 ENCOUNTER — APPOINTMENT (OUTPATIENT)
Dept: OBGYN | Facility: CLINIC | Age: 52
End: 2022-10-18

## 2022-10-18 VITALS
SYSTOLIC BLOOD PRESSURE: 126 MMHG | DIASTOLIC BLOOD PRESSURE: 70 MMHG | OXYGEN SATURATION: 98 % | BODY MASS INDEX: 27.88 KG/M2 | HEIGHT: 60 IN | TEMPERATURE: 97.3 F | RESPIRATION RATE: 16 BRPM | WEIGHT: 142 LBS | HEART RATE: 81 BPM

## 2022-10-18 DIAGNOSIS — Z98.890 OTHER SPECIFIED POSTPROCEDURAL STATES: ICD-10-CM

## 2022-10-18 PROCEDURE — 99213 OFFICE O/P EST LOW 20 MIN: CPT | Mod: 24

## 2022-10-25 ENCOUNTER — APPOINTMENT (OUTPATIENT)
Dept: OPHTHALMOLOGY | Facility: CLINIC | Age: 52
End: 2022-10-25

## 2022-10-28 ENCOUNTER — TRANSCRIPTION ENCOUNTER (OUTPATIENT)
Age: 52
End: 2022-10-28

## 2022-11-03 ENCOUNTER — APPOINTMENT (OUTPATIENT)
Dept: OPHTHALMOLOGY | Facility: CLINIC | Age: 52
End: 2022-11-03

## 2022-11-04 PROBLEM — Z98.890 POST-OPERATIVE STATE: Status: ACTIVE | Noted: 2022-11-04

## 2022-11-04 NOTE — PLAN
[FreeTextEntry1] : \par \par I spent the time noted on the day of this patient encounter preparing for, providing and documenting the above service. I have  counseled and educated the patient on the differential, workup, disease course, and treatment/management plan. Education was provided to the patient during this encounter. All questions and concerns were answered and addressed in detail.\par \par Nano Wright MD\par

## 2022-11-14 ENCOUNTER — TRANSCRIPTION ENCOUNTER (OUTPATIENT)
Age: 52
End: 2022-11-14

## 2022-11-16 ENCOUNTER — APPOINTMENT (OUTPATIENT)
Dept: OBGYN | Facility: CLINIC | Age: 52
End: 2022-11-16

## 2022-11-16 VITALS
SYSTOLIC BLOOD PRESSURE: 120 MMHG | OXYGEN SATURATION: 98 % | DIASTOLIC BLOOD PRESSURE: 70 MMHG | BODY MASS INDEX: 27.88 KG/M2 | HEART RATE: 80 BPM | WEIGHT: 142 LBS | TEMPERATURE: 98.9 F | HEIGHT: 60 IN

## 2022-11-16 PROCEDURE — 99213 OFFICE O/P EST LOW 20 MIN: CPT | Mod: 24

## 2022-11-16 NOTE — PHYSICAL EXAM
[Chaperone Present] : A chaperone was present in the examining room during all aspects of the physical examination [Appropriately responsive] : appropriately responsive [Alert] : alert [No Acute Distress] : no acute distress [Soft] : soft [Non-tender] : non-tender [Non-distended] : non-distended [No HSM] : No HSM [No Lesions] : no lesions [No Mass] : no mass [Oriented x3] : oriented x3 [Labia Majora] : normal [Labia Minora] : normal [Normal] : normal [Absent] : absent [Uterine Adnexae] : absent [FreeTextEntry7] : right lower quadrant incision, clean dry intact--non tender to touch, cleaned with alcohol

## 2022-11-20 ENCOUNTER — RESULT CHARGE (OUTPATIENT)
Age: 52
End: 2022-11-20

## 2022-11-21 PROBLEM — Z51.81 MEDICATION MONITORING ENCOUNTER: Status: ACTIVE | Noted: 2020-11-18

## 2022-11-22 ENCOUNTER — APPOINTMENT (OUTPATIENT)
Dept: INTERNAL MEDICINE | Facility: CLINIC | Age: 52
End: 2022-11-22

## 2022-11-22 ENCOUNTER — NON-APPOINTMENT (OUTPATIENT)
Age: 52
End: 2022-11-22

## 2022-11-22 VITALS
HEIGHT: 60 IN | SYSTOLIC BLOOD PRESSURE: 130 MMHG | WEIGHT: 156 LBS | BODY MASS INDEX: 30.63 KG/M2 | DIASTOLIC BLOOD PRESSURE: 82 MMHG | OXYGEN SATURATION: 97 % | HEART RATE: 77 BPM

## 2022-11-22 DIAGNOSIS — Z51.81 ENCOUNTER FOR THERAPEUTIC DRUG LVL MONITORING: ICD-10-CM

## 2022-11-22 PROCEDURE — 99214 OFFICE O/P EST MOD 30 MIN: CPT

## 2022-11-23 ENCOUNTER — APPOINTMENT (OUTPATIENT)
Dept: OBGYN | Facility: CLINIC | Age: 52
End: 2022-11-23

## 2022-11-23 VITALS
WEIGHT: 156 LBS | OXYGEN SATURATION: 97 % | TEMPERATURE: 97.9 F | SYSTOLIC BLOOD PRESSURE: 130 MMHG | BODY MASS INDEX: 30.63 KG/M2 | HEIGHT: 60 IN | DIASTOLIC BLOOD PRESSURE: 80 MMHG | HEART RATE: 80 BPM

## 2022-11-23 DIAGNOSIS — T14.8XXD OTHER INJURY OF UNSPECIFIED BODY REGION, SUBSEQUENT ENCOUNTER: ICD-10-CM

## 2022-11-23 PROCEDURE — 99212 OFFICE O/P EST SF 10 MIN: CPT

## 2022-11-23 NOTE — PHYSICAL EXAM
[Chaperone Present] : A chaperone was present in the examining room during all aspects of the physical examination [Awake] : awake [Alert] : alert [Acute Distress] : acute distress [Soft] : soft [Tender] : non tender [Distended] : not distended [H/Smegaly] : no hepatosplenomegaly [Soft, Nontender] : the abdomen was soft and nontender

## 2022-11-23 NOTE — CHIEF COMPLAINT
[Urgent Visit] : Urgent Visit [FreeTextEntry1] : C/O wound infection, some pus from wound  S/P LSCAH/BSO 10/3/22  Well since surgery Small wound dehiscence Rt post site  Started Neosporin application last week

## 2022-11-24 ENCOUNTER — TRANSCRIPTION ENCOUNTER (OUTPATIENT)
Age: 52
End: 2022-11-24

## 2022-11-24 LAB
ALBUMIN SERPL ELPH-MCNC: 4.3 G/DL
ALP BLD-CCNC: 79 U/L
ALT SERPL-CCNC: 37 U/L
ANION GAP SERPL CALC-SCNC: 12 MMOL/L
AST SERPL-CCNC: 30 U/L
BILIRUB SERPL-MCNC: 0.2 MG/DL
BUN SERPL-MCNC: 17 MG/DL
CALCIUM SERPL-MCNC: 9.4 MG/DL
CHLORIDE SERPL-SCNC: 99 MMOL/L
CO2 SERPL-SCNC: 27 MMOL/L
CREAT SERPL-MCNC: 0.73 MG/DL
EGFR: 98 ML/MIN/1.73M2
GLUCOSE SERPL-MCNC: 141 MG/DL
POTASSIUM SERPL-SCNC: 4.5 MMOL/L
PROT SERPL-MCNC: 7.1 G/DL
SODIUM SERPL-SCNC: 138 MMOL/L

## 2022-11-27 ENCOUNTER — NON-APPOINTMENT (OUTPATIENT)
Age: 52
End: 2022-11-27

## 2022-11-27 LAB — BACTERIA SPEC CULT: ABNORMAL

## 2022-11-28 ENCOUNTER — NON-APPOINTMENT (OUTPATIENT)
Age: 52
End: 2022-11-28

## 2022-11-29 ENCOUNTER — TRANSCRIPTION ENCOUNTER (OUTPATIENT)
Age: 52
End: 2022-11-29

## 2022-11-29 ENCOUNTER — RX RENEWAL (OUTPATIENT)
Age: 52
End: 2022-11-29

## 2022-11-29 DIAGNOSIS — L03.90 CELLULITIS, UNSPECIFIED: Chronic | ICD-10-CM

## 2022-12-07 ENCOUNTER — APPOINTMENT (OUTPATIENT)
Dept: OBGYN | Facility: CLINIC | Age: 52
End: 2022-12-07

## 2022-12-07 VITALS
OXYGEN SATURATION: 97 % | HEART RATE: 80 BPM | DIASTOLIC BLOOD PRESSURE: 60 MMHG | BODY MASS INDEX: 30.63 KG/M2 | TEMPERATURE: 97.8 F | HEIGHT: 60 IN | WEIGHT: 156 LBS | SYSTOLIC BLOOD PRESSURE: 120 MMHG

## 2022-12-07 DIAGNOSIS — N95.1 MENOPAUSAL AND FEMALE CLIMACTERIC STATES: ICD-10-CM

## 2022-12-07 PROCEDURE — 99213 OFFICE O/P EST LOW 20 MIN: CPT | Mod: 24

## 2022-12-19 NOTE — PATIENT PROFILE ADULT - DOMESTIC TRAVEL HIGH RISK QUESTION
Diagnosis: MDS    Protocol/Chemo Regimen: Vidaza/Venetoclax (Vidaza 75mg/m2= 148mg subcutaneously qd x 7d, Venetoclax 100mg x1d, 200mg x1d, 400mg therafter)  Day: 4     Pt endorsed:     Review of Systems:     Pain scale:  0                         Diet: Diet, Regular (12-16-22 @ 08:06) [Active]    Allergies: No Known Allergies    MEDICATIONS  (STANDING):  allopurinol 300 milliGRAM(s) Oral daily  azaCITIDine Injectable (eMAR) 49.33 milliGRAM(s) SubCutaneous every 24 hours  azaCITIDine Injectable (eMAR) 49.33 milliGRAM(s) SubCutaneous every 24 hours  azaCITIDine Injectable (eMAR) 49.33 milliGRAM(s) SubCutaneous every 24 hours  Biotene Dry Mouth Oral Rinse 15 milliLiter(s) Swish and Spit three times a day  chlorhexidine 2% Cloths 1 Application(s) Topical daily  dextrose 5%. 1000 milliLiter(s) (50 mL/Hr) IV Continuous <Continuous>  dextrose 5%. 1000 milliLiter(s) (100 mL/Hr) IV Continuous <Continuous>  dextrose 50% Injectable 25 Gram(s) IV Push once  dextrose 50% Injectable 12.5 Gram(s) IV Push once  dextrose 50% Injectable 25 Gram(s) IV Push once  enalapril 20 milliGRAM(s) Oral daily  FIRST- Mouthwash  BLM 30 milliLiter(s) Swish and Spit four times a day  glucagon  Injectable 1 milliGRAM(s) IntraMuscular once  insulin lispro (ADMELOG) corrective regimen sliding scale   SubCutaneous three times a day before meals  insulin lispro (ADMELOG) corrective regimen sliding scale   SubCutaneous at bedtime  ondansetron Injectable 8 milliGRAM(s) IV Push every 24 hours  sodium chloride 0.9% lock flush 3 milliLiter(s) IV Push every 8 hours  sodium chloride 0.9%. 1000 milliLiter(s) (75 mL/Hr) IV Continuous <Continuous>  venetoclax 400 milliGRAM(s) Oral <User Schedule>    MEDICATIONS  (PRN):  acetaminophen     Tablet .. 650 milliGRAM(s) Oral every 6 hours PRN Temp greater or equal to 38C (100.4F), Mild Pain (1 - 3)  dextrose Oral Gel 15 Gram(s) Oral once PRN Blood Glucose LESS THAN 70 milliGRAM(s)/deciliter      Vital Signs Last 24 Hrs  T(C): 36.9 (19 Dec 2022 05:50), Max: 37.6 (18 Dec 2022 08:15)  T(F): 98.4 (19 Dec 2022 05:50), Max: 99.6 (18 Dec 2022 08:15)  HR: 90 (19 Dec 2022 05:50) (75 - 90)  BP: 110/65 (19 Dec 2022 05:50) (110/65 - 122/71)  BP(mean): --  RR: 18 (19 Dec 2022 05:50) (18 - 18)  SpO2: 99% (19 Dec 2022 05:50) (99% - 100%)    Parameters below as of 19 Dec 2022 05:50  Patient On (Oxygen Delivery Method): room air    I&O's Summary    18 Dec 2022 07:01  -  19 Dec 2022 07:00  --------------------------------------------------------  IN: 3101 mL / OUT: 1400 mL / NET: 1701 mL        PHYSICAL EXAM  General: NAD  HEENT: clear oropharynx, anicteric sclera, pink conjunctiva  CV: normal S1/S2 RRR  Lungs: clear to auscultation bilaterally, no wheezes, no rales  Abdomen: soft non-tender non-distended  Ext: no edema  Skin: no rashes  Neuro: alert and oriented X 3  Central Line: N/A    LABS:     ------------------------    CULTURES:    None      RADIOLOGY & ADDITIONAL STUDIES:    None     Diagnosis: MDS    Protocol/Chemo Regimen: Vidaza/Venetoclax (Vidaza 75mg/m2= 148mg subcutaneously qd x 7d, Venetoclax 100mg x1d, 200mg x1d, 400mg therafter)  Day: 4     Pt endorsed: No overnight events, tolerating chemotherapy    Review of Systems: Denies n/v, abdominal pain, HA or dizziness    Pain scale:  0                         Diet: Diet, Regular (12-16-22 @ 08:06) [Active]    Allergies: No Known Allergies    MEDICATIONS  (STANDING):  allopurinol 300 milliGRAM(s) Oral daily  azaCITIDine Injectable (eMAR) 49.33 milliGRAM(s) SubCutaneous every 24 hours  azaCITIDine Injectable (eMAR) 49.33 milliGRAM(s) SubCutaneous every 24 hours  azaCITIDine Injectable (eMAR) 49.33 milliGRAM(s) SubCutaneous every 24 hours  Biotene Dry Mouth Oral Rinse 15 milliLiter(s) Swish and Spit three times a day  chlorhexidine 2% Cloths 1 Application(s) Topical daily  dextrose 5%. 1000 milliLiter(s) (50 mL/Hr) IV Continuous <Continuous>  dextrose 5%. 1000 milliLiter(s) (100 mL/Hr) IV Continuous <Continuous>  dextrose 50% Injectable 25 Gram(s) IV Push once  dextrose 50% Injectable 12.5 Gram(s) IV Push once  dextrose 50% Injectable 25 Gram(s) IV Push once  enalapril 20 milliGRAM(s) Oral daily  FIRST- Mouthwash  BLM 30 milliLiter(s) Swish and Spit four times a day  glucagon  Injectable 1 milliGRAM(s) IntraMuscular once  insulin lispro (ADMELOG) corrective regimen sliding scale   SubCutaneous three times a day before meals  insulin lispro (ADMELOG) corrective regimen sliding scale   SubCutaneous at bedtime  ondansetron Injectable 8 milliGRAM(s) IV Push every 24 hours  sodium chloride 0.9% lock flush 3 milliLiter(s) IV Push every 8 hours  sodium chloride 0.9%. 1000 milliLiter(s) (75 mL/Hr) IV Continuous <Continuous>  venetoclax 400 milliGRAM(s) Oral <User Schedule>    MEDICATIONS  (PRN):  acetaminophen     Tablet .. 650 milliGRAM(s) Oral every 6 hours PRN Temp greater or equal to 38C (100.4F), Mild Pain (1 - 3)  dextrose Oral Gel 15 Gram(s) Oral once PRN Blood Glucose LESS THAN 70 milliGRAM(s)/deciliter      Vital Signs Last 24 Hrs  T(C): 36.9 (19 Dec 2022 05:50), Max: 37.6 (18 Dec 2022 08:15)  T(F): 98.4 (19 Dec 2022 05:50), Max: 99.6 (18 Dec 2022 08:15)  HR: 90 (19 Dec 2022 05:50) (75 - 90)  BP: 110/65 (19 Dec 2022 05:50) (110/65 - 122/71)  BP(mean): --  RR: 18 (19 Dec 2022 05:50) (18 - 18)  SpO2: 99% (19 Dec 2022 05:50) (99% - 100%)    Parameters below as of 19 Dec 2022 05:50  Patient On (Oxygen Delivery Method): room air    I&O's Summary    18 Dec 2022 07:01  -  19 Dec 2022 07:00  --------------------------------------------------------  IN: 3101 mL / OUT: 1400 mL / NET: 1701 mL        PHYSICAL EXAM  General: Sitting up in bed, NAD  HEENT: clear oropharynx, anicteric sclera, pink conjunctiva  CV: normal S1/S2, reg  Lungs: clear to auscultation bilaterally, no wheezes, no rales  Abdomen: +BS, soft non-tender non-distended  Ext: no edema  Skin: no rashes  Neuro: alert and oriented X 3  Central Line: N/A    LABS:                        7.7    4.07  )-----------( 55       ( 19 Dec 2022 07:21 )             23.6     19 Dec 2022 07:17    139    |  104    |  14     ----------------------------<  151    4.4     |  24     |  1.17     Ca    9.2        19 Dec 2022 07:17  Phos  3.4       19 Dec 2022 07:17  Mg     1.7       19 Dec 2022 07:17    TPro  7.3    /  Alb  4.0    /  TBili  0.9    /  DBili  x      /  AST  29     /  ALT  43     /  AlkPhos  74     19 Dec 2022 07:17    POCT Blood Glucose.: 159 mg/dL (19 Dec 2022 12:06)  POCT Blood Glucose.: 150 mg/dL (19 Dec 2022 07:42)  POCT Blood Glucose.: 187 mg/dL (18 Dec 2022 21:38)  POCT Blood Glucose.: 129 mg/dL (18 Dec 2022 16:51)    LIVER FUNCTIONS - ( 19 Dec 2022 07:17 )  Alb: 4.0 g/dL / Pro: 7.3 g/dL / ALK PHOS: 74 U/L / ALT: 43 U/L / AST: 29 U/L / GGT: x           CULTURES:    None      RADIOLOGY & ADDITIONAL STUDIES:    None     No

## 2023-01-10 ENCOUNTER — TRANSCRIPTION ENCOUNTER (OUTPATIENT)
Age: 53
End: 2023-01-10

## 2023-01-18 ENCOUNTER — APPOINTMENT (OUTPATIENT)
Dept: OBGYN | Facility: CLINIC | Age: 53
End: 2023-01-18
Payer: MEDICAID

## 2023-01-18 ENCOUNTER — RESULT REVIEW (OUTPATIENT)
Age: 53
End: 2023-01-18

## 2023-01-18 VITALS
HEIGHT: 60 IN | HEART RATE: 80 BPM | SYSTOLIC BLOOD PRESSURE: 120 MMHG | WEIGHT: 156 LBS | BODY MASS INDEX: 30.63 KG/M2 | TEMPERATURE: 96.9 F | DIASTOLIC BLOOD PRESSURE: 60 MMHG | OXYGEN SATURATION: 98 %

## 2023-01-18 PROCEDURE — 99396 PREV VISIT EST AGE 40-64: CPT

## 2023-01-24 ENCOUNTER — APPOINTMENT (OUTPATIENT)
Dept: INTERNAL MEDICINE | Facility: CLINIC | Age: 53
End: 2023-01-24
Payer: MEDICAID

## 2023-01-24 PROCEDURE — 99214 OFFICE O/P EST MOD 30 MIN: CPT | Mod: 95

## 2023-01-27 ENCOUNTER — APPOINTMENT (OUTPATIENT)
Dept: INTERNAL MEDICINE | Facility: CLINIC | Age: 53
End: 2023-01-27
Payer: MEDICAID

## 2023-01-27 LAB
C TRACH RRNA SPEC QL NAA+PROBE: NOT DETECTED
CANDIDA VAG CYTO: NOT DETECTED
CYTOLOGY CVX/VAG DOC THIN PREP: NORMAL
G VAGINALIS+PREV SP MTYP VAG QL MICRO: NOT DETECTED
HPV HIGH+LOW RISK DNA PNL CVX: NOT DETECTED
N GONORRHOEA RRNA SPEC QL NAA+PROBE: NOT DETECTED
SOURCE AMPLIFICATION: NORMAL
T VAGINALIS VAG QL WET PREP: NOT DETECTED

## 2023-01-27 PROCEDURE — 90471 IMMUNIZATION ADMIN: CPT

## 2023-01-27 PROCEDURE — 90715 TDAP VACCINE 7 YRS/> IM: CPT

## 2023-01-31 ENCOUNTER — APPOINTMENT (OUTPATIENT)
Dept: ORTHOPEDIC SURGERY | Facility: CLINIC | Age: 53
End: 2023-01-31

## 2023-02-01 ENCOUNTER — APPOINTMENT (OUTPATIENT)
Dept: OBGYN | Facility: CLINIC | Age: 53
End: 2023-02-01

## 2023-02-10 NOTE — HISTORY OF PRESENT ILLNESS
[FreeTextEntry1] : 52-year-old status post supracervical hysterectomy presenting today for annual exam, no complaints.  Started on gabapentin for symptoms of hot flashes and insomnia.  Currently on 200 mg nightly.  Doing well.  Scar is healing well

## 2023-03-01 ENCOUNTER — TRANSCRIPTION ENCOUNTER (OUTPATIENT)
Age: 53
End: 2023-03-01

## 2023-03-01 ENCOUNTER — APPOINTMENT (OUTPATIENT)
Dept: INTERNAL MEDICINE | Facility: CLINIC | Age: 53
End: 2023-03-01
Payer: MEDICAID

## 2023-03-13 ENCOUNTER — TRANSCRIPTION ENCOUNTER (OUTPATIENT)
Age: 53
End: 2023-03-13

## 2023-03-22 ENCOUNTER — APPOINTMENT (OUTPATIENT)
Dept: INTERNAL MEDICINE | Facility: CLINIC | Age: 53
End: 2023-03-22
Payer: MEDICAID

## 2023-03-22 PROCEDURE — 99214 OFFICE O/P EST MOD 30 MIN: CPT | Mod: 95

## 2023-04-14 ENCOUNTER — APPOINTMENT (OUTPATIENT)
Dept: OBGYN | Facility: CLINIC | Age: 53
End: 2023-04-14
Payer: MEDICAID

## 2023-04-14 DIAGNOSIS — N39.0 URINARY TRACT INFECTION, SITE NOT SPECIFIED: ICD-10-CM

## 2023-04-14 PROCEDURE — 99212 OFFICE O/P EST SF 10 MIN: CPT | Mod: 95

## 2023-04-14 NOTE — HISTORY OF PRESENT ILLNESS
[FreeTextEntry1] : This visit was provided via telehealth using real time 2 way av technology. The patient Karime Arboleda was located at home at the time of the visit. The provider Dr. Nano Wright was located at the medical office located in [address] [place] [address] at the time of the visit. The patient and provider participated in the telehealth visit. Verbal consent for the telehealth visit was given by the patient on 4/14/23\par \par \par \par Vaginal dryness and frequent UTI--reviewed urinating after sex and correct wiping technique. Needs mammo---scheduled.

## 2023-04-19 ENCOUNTER — APPOINTMENT (OUTPATIENT)
Dept: INTERNAL MEDICINE | Facility: CLINIC | Age: 53
End: 2023-04-19
Payer: MEDICAID

## 2023-04-19 DIAGNOSIS — F41.1 GENERALIZED ANXIETY DISORDER: ICD-10-CM

## 2023-04-19 DIAGNOSIS — F10.21 ALCOHOL DEPENDENCE, IN REMISSION: ICD-10-CM

## 2023-04-19 PROCEDURE — 99213 OFFICE O/P EST LOW 20 MIN: CPT | Mod: 95

## 2023-04-21 PROBLEM — F10.21 ALCOHOL USE DISORDER, MODERATE, IN SUSTAINED REMISSION: Status: ACTIVE | Noted: 2021-01-25

## 2023-04-21 NOTE — DISCUSSION/SUMMARY
[FreeTextEntry1] : 51 yo F with h/o DM, hypothyroidism, dual dx AUD and MDD\par \par Last drink 6/7/20\par Has been completely off Naltrexone for one month now.  She does not report any drinking episodes and craving has not been an issue. Says she really feels she is done with that phase of her life -has not been tempted to drink.  She says she is realistic however -she knows returning to drinking after long stretches of abstinence does happen and knows she has to stay vigilant.  She continues to have a good support system, is still seeing her psychiatrist regularly, and participating group although she has missed the last 2 weeks.  Is planning on participating in this week's group session.  She also intends to keep naltrexone on hand, so that if she does experience any return of craving, she can restart the medicine.  She says it also helps that almost everyone in her cervical nose she does not drink anymore, so she is often not even offered a drink while others are drinking. \par She will continue off Naltrexone for now.  She has naltrexone on hand in case she needs it.  Also discussed strategy of as needed use of naltrexone --> if there are upcoming events or situations that she is concerned may be especially challenging when it comes to alcohol use, she can take naltrexone that day and it may provide some extra benefit and support.\par Follow-up in 2 months, sooner if needed.

## 2023-04-21 NOTE — HISTORY OF PRESENT ILLNESS
[de-identified] : 51 yo F with h/o DM, hypothyroidism, dual dx AUD and MDD\par \par Has been completely off Naltrexone for one month now.  She does not report any drinking episodes and craving has not been an issue. Says she really feels she is done with that phase of her life -has not been tempted to drink.  She says she is realistic however -she knows returning to drinking after long stretches of abstinence does happen and knows she has to stay vigilant.  She continues to have a good support system, is still seeing her psychiatrist regularly, and participating group although she has missed the last 2 weeks.  Is planning on participating in this week's group session.  She also intends to keep naltrexone on hand, so that if she does experience any return of craving, she can restart the medicine.  She says it also helps that almost everyone in her cervical nose she does not drink anymore, so she is often not even offered a drink while others are drinking. \par \par Prior 3/22/23\par RE AUD: in sustained remission. Excellent support system including family, individual and group treatment at [facility], and PO Naltrexone. \par She is approaching the three-year sobriety milestone.  Reports has not had any cravings since after the first year. Last visit, brought that she would like to consider reducing or eliminating the naltrexone. \par We discussed risk versus benefits at length. Now s/p trial at 25 mg of naltrexone for past 2 months. She has done quite well on 25 mg QD with no brearkthrough craving or temptation to drink. and then touch base to see how she's doing. She also discussed taper with her psychiatrist at [facility] who is supportive.\par Discussed trial off Naltrexone completely and she feels ready. If any breakthrough craving occurs, she knows to call immediately and will go back to 25 mg or full pill.  \par F/u in one month or sooner if needed. \par \par RE MDD: on prozac with good control. Monitored by psychiatrist.\par \par f/u 1 month.  \par \par Prior 23\par Reports she is doing well. Almost at 3 year candice of sobriety. She really feels she has come to a point where she has gotten beyond thoughts or cravings for alcohol for so long she wonders if she needs to be on Naltrexone any more. \par \par RE AUD: in sustained remission. Excellent support system including family, individual and group treatment at [facility], and PO Naltrexone. \par She is approaching the three-year sobriety milestone.  Reports has not had any cravings since after the first year. She would like to consider reducing or eliminating the naltrexone and wonders my thoughts. \par Discussed risk versus benefits at length. We will do a trial at 25 mg of naltrexone for the next month and then touch base to see how she's doing. If any breakthrough craving occurs, she knows to call immediately and will go back to the full pill. She will also discuss with her psychiatrist at [facility],.\par RE MDD: on prozac with good control. Monitored by psychiatrist.\par \par f/u 1 month.  Call if any breakthrough craving - will increase back to 50 mg if experiencing any.\par \par Prior 22\par Feeling good post - op. laparoscopic surg - cervix preserved. Surg unexpectedly long due to uterine size. \par RE ETOH: remains in remission. still doing weekly group, still monthly session with therapist. still see psychiatrist monthly. \par Continue with Naltrexone nightly.\par \par Prior (22)\par RE AUD: mod AUD in sustained remission.\par Reached milestone: 2 years sober this past .  She is feeling very good about how she is doing. \par Summer was hectic so she missed several appts with me and sev group sessions - however her progress remained strong.\par still taking meds Q PM, no significant cravings and no ETOH use. \par Recently has noted that stressful triggers no longer result in cravings - she has developed alternate responses to cope that seem to be working well. \par Last LFT's  and WNL's. \par continue current plan - now that kids are back at school can get back to participating in group sessions regularly. \par Will f/u for in office visit for exam and labs. \par RE MDD (dual dx): as above, on SSRI (fluoxitine 80 mg) - under care of psychiatrist through [facility] -\par \par will go to Q 2 month (instead of monthly) f/u and see how she does. \par \par 21: \par  will candice one year of sobriety. As of last visit, so far without any relapse which really surprises her. When she first started, never really thought she would make it to this milestone. \par Her support system is solidly in place:\par - She is attending her meetings at [facility], back with her  group, and having sessions with Lukasz twice a month.\par - Using tools in her toolbox, walking dogs nightly - still has many triggers so she is not taking anything for granted. \par - Takes naltrexone with dinner - does report that to some extent, she has lost some of the craving she used to experience - her mouth used to water for the taste of wine.. she rarely has that anymore. \par - psychiatrist at [facility] managing her depression meds\par \par 21: \par Reports she has been under an increased amount of stress lately. Her uncle has become increasingly debilitated and it looks as though he may need to go to a long term SNF for care.  Her sister is having issues at work, and the kids (has been taking care of her sisters 3 kids since they were little) are struggling with a summer still with COVID as a significant factor. They are despondent and rarely want to leave the house. \par She has reached 13 month milestone - feels stronger and more secure in her ability to maintain sobriety.  She continues to take Naltrexone every night around 6:30 with dinner. needs a refill. Cravings are not as bad as they used to be - hr commitment to not drink at all remains strong - she continues to draw on a toolbox of strategies and times when she otherwise might be tempted to have a drink. Meditates, takes long walks with her friends and her dogs, stays busy, makes sure she has quiet time to stay in touch with her inner self. \par She is seeing a new psychiatrist at [facility], Dr Bailey.  her fluoxetine was increased in May to 80 mg daily.  \par She is using hydroxyzine 25 mg HS for sleep which is working well. \par \par 21\par Uncle passed away last week. She says the week was difficult - he lived a long and good life but she wished he could have  at home.  is glad his suffering is over.   She had a transient yearning for a drink but wasn’t really seriously tempted - as usual, called her sister who set her straight right away. \par Kids are now in school everyday - no more remote option. she likes having the extra time during the day.  In the afternoons, she helps them with homework. \par \par 10/19/21:\par Doing well. Taking naltrexone daily, no side effects. Continues to stay connected to care at [facility].  Has visits with psych Q 3 weeks, sees therapist Q 2-3 weeks, participates in group weekly on Thursday morning. \par Stress around the time of her Uncles  has diminished - did have a few difficult moment but as usual, reached out to her sister who is her go to when she feels any sense of slipping.  life now back to normal. \carmen Does have her brothers wedding coming up - again will be difficult as her father will be there which triggers a lot of anger in her.  She has strategies prepared. \par \par 21\par Recently saw Dr Velez and got her flu shot. \par Has been taking Naltrexone daily - reports no recent craving.  Last she can recall was at the time of her uncle's death, however even that was manageable with her usual strategies. \par Still working with [facility] - \par See's her therapist Cluadine Q 2-3 weeks for 1:1 therapy sessions, \par participates in group session Q Thurs AM - all female group.\par See's Psychiatrist Dr Ocampo Q 3-4 weeks. \par One concern of her remains her weight - somewhat frustrated that she is at a plateau. Knows she is exercising less then in the past - walking her dog less.  The hours that she helps her sister has changed - she is starting earlier so less time available to exercise. \par \par 21\par Reports has been dong well. No cravings. Staying on track. Continues to take naltrexone nightly with no side effects.  Not missing any doses. Still connected to care at [facility] for  support and sees psychiatrist ~ once a month.  Fluoxetine has been increased to 80 mg daily. She takes two 40 mg pills QD. \par Scheduled to take booster on Friday.  She has a bit of a cold - if she feels any worse may delay taking the booster. \par \par 22\par Diagnosed with COVID mid December. By her report, had every sx - fatigue, fever, chills, dizziness, URI sx, HA, myalgias, cough, loss of taste and smell. Some sx improved over 4-6 weeks and some have persisted. Still feels exhausted much of the time. Regarding her drinking - being sick has really extinguished any craving for ETOH.  Has less energy, less appetite for food, and although the Naltrexone and these past 18 months have really reduced her interest in and tendency to turn to ETOH, being sick has reduced it even more.  Has not had any craving at all these past couple of months. \par RE MDD: still working with psych at [facility] and remains on 60 mg of fluoxetine\par RE weight: still frustrated at her lack of wt loss\par \par 3/30/22\par Still in recovery phase with long COVID - does feel she is steadily improving wrt her stamina although is still having occ bad days where she wakes up with no energy.  It is more typical now for her to have good days where she doesn’t get really tired until 9 PM. \par RE AUD: continues to take naltrexone Q evening with dinner. no side effects and doesn’t miss days. No craving this past month and no ETOH use. \par Still participates in one peer support group at [facility] and has individual sessions with psychiatrist. \par Last LFT's  and WNL's. \par \par 5/3/22\par started ozempic and very regimented diet and has lost 17 lbs so far - started taking it 22\par RE AUD: Starting Ozempic has not changed relationship with ETOH - has not been craving ETOH, and even at parties, where others are drinking, she does not find it difficult to abstain. \par next month will be 2 years of abstinence and they are planning on having an alcohol free celebration.  She feels very good because she didn’t really think she could it and this is a major milestone. \par Continues to take Naltrexone every evening and not having any side effects. \par Also is continuing to participate with  support - thursday AM weekly group and individual tx every 2 weeks \par Sees psych once a month. - remains on fluoxitine 80 mg\par RE long COVID: finally seems to be resolving - took about 3.5 months\par \par 22\par Last TEB visit 4 months ago.\par Reached milestone: 2 years sober this past .  She is feeling very good about how she is doing. Summer was hectic so she missed several appts with me and sev group sessions - however her progress remained strong.\par still taking meds Q PM, no significant cravings and no ETOH use. \par Recently has noted that stressful triggers no longer result in cravings - she has developed alternate responses to cope that seem to be working well.

## 2023-04-21 NOTE — REASON FOR VISIT
[Starting, patient seen in-person within last 6 months] : Telehealth services are being started as patient has seen in-person within last 6 months. [Home] : The patient, [unfilled], was located at home, [unfilled], at the time of the visit. [Verbal consent obtained from patient/other participant(s)] : Verbal consent for telehealth/telephonic services obtained from patient/other participant(s) [FreeTextEntry4] : 9:22 [FreeTextEntry5] : 9 [FreeTextEntry1] : f/u for AUD

## 2023-05-12 ENCOUNTER — APPOINTMENT (OUTPATIENT)
Dept: MAMMOGRAPHY | Facility: CLINIC | Age: 53
End: 2023-05-12
Payer: MEDICAID

## 2023-05-12 ENCOUNTER — OUTPATIENT (OUTPATIENT)
Dept: OUTPATIENT SERVICES | Facility: HOSPITAL | Age: 53
LOS: 1 days | End: 2023-05-12
Payer: COMMERCIAL

## 2023-05-12 ENCOUNTER — RESULT REVIEW (OUTPATIENT)
Age: 53
End: 2023-05-12

## 2023-05-12 ENCOUNTER — APPOINTMENT (OUTPATIENT)
Dept: ULTRASOUND IMAGING | Facility: CLINIC | Age: 53
End: 2023-05-12
Payer: MEDICAID

## 2023-05-12 DIAGNOSIS — Z90.89 ACQUIRED ABSENCE OF OTHER ORGANS: Chronic | ICD-10-CM

## 2023-05-12 DIAGNOSIS — Z01.419 ENCOUNTER FOR GYNECOLOGICAL EXAMINATION (GENERAL) (ROUTINE) WITHOUT ABNORMAL FINDINGS: ICD-10-CM

## 2023-05-12 DIAGNOSIS — Z98.890 OTHER SPECIFIED POSTPROCEDURAL STATES: Chronic | ICD-10-CM

## 2023-05-12 PROCEDURE — 77066 DX MAMMO INCL CAD BI: CPT | Mod: 26

## 2023-05-12 PROCEDURE — G0279: CPT | Mod: 26

## 2023-05-12 PROCEDURE — 76641 ULTRASOUND BREAST COMPLETE: CPT | Mod: 26,50

## 2023-05-12 PROCEDURE — G0279: CPT

## 2023-05-12 PROCEDURE — 77063 BREAST TOMOSYNTHESIS BI: CPT

## 2023-05-12 PROCEDURE — 77066 DX MAMMO INCL CAD BI: CPT

## 2023-05-12 PROCEDURE — 77067 SCR MAMMO BI INCL CAD: CPT

## 2023-05-12 PROCEDURE — 76641 ULTRASOUND BREAST COMPLETE: CPT

## 2023-05-15 ENCOUNTER — NON-APPOINTMENT (OUTPATIENT)
Age: 53
End: 2023-05-15

## 2023-05-16 DIAGNOSIS — R39.9 UNSPECIFIED SYMPTOMS AND SIGNS INVOLVING THE GENITOURINARY SYSTEM: ICD-10-CM

## 2023-05-16 LAB
BILIRUB UR QL STRIP: NEGATIVE
CLARITY UR: CLEAR
COLLECTION METHOD: NORMAL
GLUCOSE UR-MCNC: NORMAL
HCG UR QL: 0.2 EU/DL
HGB UR QL STRIP.AUTO: NORMAL
KETONES UR-MCNC: NEGATIVE
LEUKOCYTE ESTERASE UR QL STRIP: NORMAL
NITRITE UR QL STRIP: NEGATIVE
PH UR STRIP: 5.5
PROT UR STRIP-MCNC: NEGATIVE
SP GR UR STRIP: 1.01

## 2023-05-18 ENCOUNTER — NON-APPOINTMENT (OUTPATIENT)
Age: 53
End: 2023-05-18

## 2023-05-18 LAB
APPEARANCE: CLEAR
BACTERIA UR CULT: NORMAL
BACTERIA: NEGATIVE /HPF
BILIRUBIN URINE: NEGATIVE
BLOOD URINE: ABNORMAL
CAST: 0 /LPF
COLOR: YELLOW
EPITHELIAL CELLS: 1 /HPF
GLUCOSE QUALITATIVE U: >=1000 MG/DL
KETONES URINE: NEGATIVE MG/DL
LEUKOCYTE ESTERASE URINE: ABNORMAL
MICROSCOPIC-UA: NORMAL
NITRITE URINE: NEGATIVE
PH URINE: 6
PROTEIN URINE: NEGATIVE MG/DL
RED BLOOD CELLS URINE: 1 /HPF
SPECIFIC GRAVITY URINE: 1.01
UROBILINOGEN URINE: 0.2 MG/DL
WHITE BLOOD CELLS URINE: 112 /HPF

## 2023-05-30 ENCOUNTER — APPOINTMENT (OUTPATIENT)
Dept: OPHTHALMOLOGY | Facility: CLINIC | Age: 53
End: 2023-05-30
Payer: MEDICAID

## 2023-05-30 ENCOUNTER — NON-APPOINTMENT (OUTPATIENT)
Age: 53
End: 2023-05-30

## 2023-05-30 PROCEDURE — 92014 COMPRE OPH EXAM EST PT 1/>: CPT

## 2023-05-30 PROCEDURE — 92133 CPTRZD OPH DX IMG PST SGM ON: CPT

## 2023-05-31 ENCOUNTER — TRANSCRIPTION ENCOUNTER (OUTPATIENT)
Age: 53
End: 2023-05-31

## 2023-06-13 ENCOUNTER — APPOINTMENT (OUTPATIENT)
Dept: OPHTHALMOLOGY | Facility: CLINIC | Age: 53
End: 2023-06-13

## 2023-06-16 ENCOUNTER — APPOINTMENT (OUTPATIENT)
Dept: OBGYN | Facility: CLINIC | Age: 53
End: 2023-06-16
Payer: MEDICAID

## 2023-06-16 PROCEDURE — 99212 OFFICE O/P EST SF 10 MIN: CPT | Mod: 95

## 2023-06-16 RX ORDER — LEUPROLIDE ACETATE 3.75 MG
3.75 KIT INTRAMUSCULAR
Qty: 1 | Refills: 1 | Status: DISCONTINUED | COMMUNITY
Start: 2022-05-25 | End: 2023-06-16

## 2023-06-16 NOTE — PLAN
[FreeTextEntry1] : \par \par \par I spent the time noted on the day of this patient encounter preparing for, providing and documenting the above service. I have  counseled and educated the patient on the differential, workup, disease course, and treatment/management plan. Education was provided to the patient during this encounter. All questions and concerns were answered and addressed in detail.\par \par \par Nano Wright MD\par

## 2023-06-16 NOTE — HISTORY OF PRESENT ILLNESS
[FreeTextEntry1] : \par \par 54 yo here today for review of sx of menopause--on 200mg of gabapentin--overall doing well however could use some symptom relief during day--would like to incrase dose to 300mg\par \par Fu in 6 weeks, if still sx, consider alternatives

## 2023-06-26 ENCOUNTER — RX RENEWAL (OUTPATIENT)
Age: 53
End: 2023-06-26

## 2023-07-19 NOTE — ED ADULT NURSE NOTE - ED CARDIAC CAPILLARY REFILL
Chinmay reagan [facility] called and wants information from patients H&P.  Writer was unable to find past appointment with Dr. Nguyen for reference.    Please advise.    [phone]   2 seconds or less

## 2023-08-01 ENCOUNTER — APPOINTMENT (OUTPATIENT)
Dept: DERMATOLOGY | Facility: CLINIC | Age: 53
End: 2023-08-01

## 2023-08-04 ENCOUNTER — APPOINTMENT (OUTPATIENT)
Dept: OBGYN | Facility: CLINIC | Age: 53
End: 2023-08-04

## 2023-09-20 ENCOUNTER — APPOINTMENT (OUTPATIENT)
Dept: INTERNAL MEDICINE | Facility: CLINIC | Age: 53
End: 2023-09-20

## 2023-11-15 ENCOUNTER — RX RENEWAL (OUTPATIENT)
Age: 53
End: 2023-11-15

## 2023-12-05 ENCOUNTER — APPOINTMENT (OUTPATIENT)
Dept: OPHTHALMOLOGY | Facility: CLINIC | Age: 53
End: 2023-12-05

## 2024-01-08 ENCOUNTER — APPOINTMENT (OUTPATIENT)
Dept: INTERNAL MEDICINE | Facility: CLINIC | Age: 54
End: 2024-01-08
Payer: MEDICAID

## 2024-01-08 VITALS
OXYGEN SATURATION: 99 % | WEIGHT: 163 LBS | HEIGHT: 60 IN | DIASTOLIC BLOOD PRESSURE: 80 MMHG | SYSTOLIC BLOOD PRESSURE: 133 MMHG | RESPIRATION RATE: 16 BRPM | HEART RATE: 99 BPM | BODY MASS INDEX: 32 KG/M2

## 2024-01-08 DIAGNOSIS — Z91.013 ALLERGY TO SEAFOOD: ICD-10-CM

## 2024-01-08 PROCEDURE — 99396 PREV VISIT EST AGE 40-64: CPT

## 2024-01-08 RX ORDER — FOLIC ACID 1 MG/1
1 TABLET ORAL
Qty: 30 | Refills: 5 | Status: DISCONTINUED | COMMUNITY
Start: 2020-06-16 | End: 2024-01-08

## 2024-01-08 RX ORDER — NITROFURANTOIN (MONOHYDRATE/MACROCRYSTALS) 25; 75 MG/1; MG/1
100 CAPSULE ORAL
Qty: 10 | Refills: 0 | Status: DISCONTINUED | COMMUNITY
Start: 2023-05-16 | End: 2024-01-08

## 2024-01-08 RX ORDER — SULFAMETHOXAZOLE AND TRIMETHOPRIM 800; 160 MG/1; MG/1
800-160 TABLET ORAL TWICE DAILY
Qty: 10 | Refills: 0 | Status: DISCONTINUED | COMMUNITY
Start: 2022-11-29 | End: 2024-01-08

## 2024-01-08 RX ORDER — THIAMINE MONONITRATE (VIT B1) 100 MG
100 TABLET ORAL
Qty: 100 | Refills: 1 | Status: DISCONTINUED | COMMUNITY
Start: 2020-08-02 | End: 2024-01-08

## 2024-01-08 RX ORDER — LEVOTHYROXINE SODIUM 0.12 MG/1
125 TABLET ORAL
Qty: 30 | Refills: 5 | Status: ACTIVE | COMMUNITY
Start: 2018-06-12 | End: 1900-01-01

## 2024-01-08 RX ORDER — EPINEPHRINE 0.3 MG/.3ML
0.3 INJECTION INTRAMUSCULAR
Qty: 1 | Refills: 1 | Status: ACTIVE | COMMUNITY
Start: 2017-09-12 | End: 1900-01-01

## 2024-01-08 RX ORDER — NALTREXONE HYDROCHLORIDE 50 MG/1
50 TABLET, FILM COATED ORAL DAILY
Qty: 30 | Refills: 11 | Status: DISCONTINUED | COMMUNITY
Start: 2020-06-10 | End: 2024-01-08

## 2024-01-08 RX ORDER — SEMAGLUTIDE 2.68 MG/ML
8 INJECTION, SOLUTION SUBCUTANEOUS
Qty: 100 | Refills: 3 | Status: DISCONTINUED | COMMUNITY
Start: 2022-03-15 | End: 2024-01-08

## 2024-01-08 RX ORDER — METRONIDAZOLE 7.5 MG/G
0.75 CREAM TOPICAL
Qty: 1 | Refills: 6 | Status: DISCONTINUED | COMMUNITY
Start: 2020-09-01 | End: 2024-01-08

## 2024-01-08 RX ORDER — ROSUVASTATIN CALCIUM 5 MG/1
5 TABLET, FILM COATED ORAL
Qty: 90 | Refills: 1 | Status: ACTIVE | COMMUNITY
Start: 2024-01-08 | End: 1900-01-01

## 2024-01-08 NOTE — ASSESSMENT
[FreeTextEntry1] : --- HM: Breast cancer screening - mammo + sono 6/2023, BIRADs 2 CRS - colonoscopy 2019, due for 5 yr repeat  Skin cancer screening - done with derm  Bone density 4/2021 normal  Cervical cancer screening - pap cotesting 1/2023, negative  Dental UTD  tdap UTD - 2023 Shingrix vaccine completed 2020 Flu shot today  check labs  DM2: Has been controlled Felt better with Ozempic, but stopped when supply became and issue.  Will reorder.  Stop metformin once Ozempic restarted.   Regular f/u with ophthalmology Working on improving dietary habits again Stay active  Labs today Start statin  Obesity: Tends to eat more junk foods during the holidays. Continues metformin. Ozempic in the past.  Felt it helped with eating healthier.   Never has done much exercise, but was doing more walking when she is eating better.    Hypothyroid: Stable, recheck levels today.

## 2024-01-08 NOTE — HISTORY OF PRESENT ILLNESS
[de-identified] : 53 y.o. female, PMHx diabetes II, obesity, alcohol misuse (in recovery, on naltrexone), reactive airway, GERD, low back pain, anxiety, depression. Presents for CPE/wellness visit.   Weight has gone up a bit.  Harder to control cravings off Ozempic.  Remains sobor.  Off naltrexone.  Continues group f/u with psychiatry and therapy.  Mood has been good with fluoxetine, PRN hydroxyzine for sleep.  Hysterectomy 11/2022.  Went well.  Improved heartburn.   Continues group f/u with psychiatry and therapy.  Mood has been good with fluoxetine, PRN hydroxyzine for sleep.

## 2024-01-08 NOTE — HEALTH RISK ASSESSMENT
[Good] : ~his/her~  mood as  good [No] : In the past 12 months have you used drugs other than those required for medical reasons? No [Alone] : lives alone [Single] : single [Fully functional (bathing, dressing, toileting, transferring, walking, feeding)] : Fully functional (bathing, dressing, toileting, transferring, walking, feeding) [Fully functional (using the telephone, shopping, preparing meals, housekeeping, doing laundry, using] : Fully functional and needs no help or supervision to perform IADLs (using the telephone, shopping, preparing meals, housekeeping, doing laundry, using transportation, managing medications and managing finances) [Never] : Never [Medical reason not done] : Medical reason not done [de-identified] : walking at times, up and down stairs through the day  [de-identified] : increased sweets  [de-identified] : continues treatment  [Reports changes in hearing] : Reports no changes in hearing [Reports changes in vision] : Reports no changes in vision [Reports changes in dental health] : Reports no changes in dental health

## 2024-01-11 ENCOUNTER — NON-APPOINTMENT (OUTPATIENT)
Age: 54
End: 2024-01-11

## 2024-01-11 DIAGNOSIS — Z00.00 ENCOUNTER FOR GENERAL ADULT MEDICAL EXAMINATION W/OUT ABNORMAL FINDINGS: ICD-10-CM

## 2024-01-11 LAB
25(OH)D3 SERPL-MCNC: 30.1 NG/ML
ALBUMIN SERPL ELPH-MCNC: 4.7 G/DL
ALP BLD-CCNC: 104 U/L
ALT SERPL-CCNC: 23 U/L
ANION GAP SERPL CALC-SCNC: 13 MMOL/L
AST SERPL-CCNC: 18 U/L
BILIRUB SERPL-MCNC: 0.2 MG/DL
BUN SERPL-MCNC: 16 MG/DL
CALCIUM SERPL-MCNC: 9.7 MG/DL
CHLORIDE SERPL-SCNC: 98 MMOL/L
CHOLEST SERPL-MCNC: 299 MG/DL
CO2 SERPL-SCNC: 24 MMOL/L
CREAT SERPL-MCNC: 0.65 MG/DL
CREAT SPEC-SCNC: 39 MG/DL
EGFR: 105 ML/MIN/1.73M2
FOLATE SERPL-MCNC: 9.4 NG/ML
GLUCOSE SERPL-MCNC: 294 MG/DL
HDLC SERPL-MCNC: 54 MG/DL
LDLC SERPL CALC-MCNC: 192 MG/DL
MICROALBUMIN 24H UR DL<=1MG/L-MCNC: <1.2 MG/DL
MICROALBUMIN/CREAT 24H UR-RTO: NORMAL MG/G
NONHDLC SERPL-MCNC: 245 MG/DL
POTASSIUM SERPL-SCNC: 4.5 MMOL/L
PROT SERPL-MCNC: 7.5 G/DL
SODIUM SERPL-SCNC: 135 MMOL/L
TRIGL SERPL-MCNC: 277 MG/DL
TSH SERPL-ACNC: 1.13 UIU/ML
VIT B12 SERPL-MCNC: 847 PG/ML

## 2024-01-12 LAB — VIT B1 SERPL-MCNC: 142.8 NMOL/L

## 2024-01-22 LAB
ESTIMATED AVERAGE GLUCOSE: 269 MG/DL
HBA1C MFR BLD HPLC: 11 %
HCT VFR BLD CALC: 44.9 %
HGB BLD-MCNC: 14.6 G/DL
MCHC RBC-ENTMCNC: 28.1 PG
MCHC RBC-ENTMCNC: 32.5 GM/DL
MCV RBC AUTO: 86.3 FL
PLATELET # BLD AUTO: 335 K/UL
RBC # BLD: 5.2 M/UL
RBC # FLD: 13 %
WBC # FLD AUTO: 8.42 K/UL

## 2024-01-23 ENCOUNTER — TRANSCRIPTION ENCOUNTER (OUTPATIENT)
Age: 54
End: 2024-01-23

## 2024-02-06 ENCOUNTER — TRANSCRIPTION ENCOUNTER (OUTPATIENT)
Age: 54
End: 2024-02-06

## 2024-02-06 RX ORDER — BLOOD-GLUCOSE METER
KIT MISCELLANEOUS
Qty: 1 | Refills: 3 | Status: ACTIVE | COMMUNITY
Start: 2024-02-06 | End: 1900-01-01

## 2024-02-06 RX ORDER — BLOOD-GLUCOSE METER
W/DEVICE KIT MISCELLANEOUS
Qty: 1 | Refills: 0 | Status: ACTIVE | COMMUNITY
Start: 2024-02-06 | End: 1900-01-01

## 2024-02-06 RX ORDER — LANCETS 33 GAUGE
EACH MISCELLANEOUS
Qty: 1 | Refills: 3 | Status: ACTIVE | COMMUNITY
Start: 2024-02-06 | End: 1900-01-01

## 2024-03-04 ENCOUNTER — RX RENEWAL (OUTPATIENT)
Age: 54
End: 2024-03-04

## 2024-03-22 ENCOUNTER — APPOINTMENT (OUTPATIENT)
Dept: OBGYN | Facility: CLINIC | Age: 54
End: 2024-03-22
Payer: MEDICAID

## 2024-03-22 VITALS
HEART RATE: 92 BPM | BODY MASS INDEX: 32 KG/M2 | SYSTOLIC BLOOD PRESSURE: 110 MMHG | OXYGEN SATURATION: 98 % | TEMPERATURE: 98 F | HEIGHT: 60 IN | DIASTOLIC BLOOD PRESSURE: 68 MMHG | RESPIRATION RATE: 18 BRPM | WEIGHT: 163 LBS

## 2024-03-22 DIAGNOSIS — Z01.419 ENCOUNTER FOR GYNECOLOGICAL EXAMINATION (GENERAL) (ROUTINE) W/OUT ABNORMAL FINDINGS: ICD-10-CM

## 2024-03-22 DIAGNOSIS — R92.8 OTHER ABNORMAL AND INCONCLUSIVE FINDINGS ON DIAGNOSTIC IMAGING OF BREAST: ICD-10-CM

## 2024-03-22 PROCEDURE — 99396 PREV VISIT EST AGE 40-64: CPT

## 2024-03-22 RX ORDER — GABAPENTIN 300 MG/1
300 CAPSULE ORAL DAILY
Qty: 90 | Refills: 3 | Status: ACTIVE | COMMUNITY
Start: 2022-12-07 | End: 1900-01-01

## 2024-03-22 NOTE — HISTORY OF PRESENT ILLNESS
Problem: Increased nutrient needs (NI-5.1)  Goal: Food and/or Nutrient Delivery  Description: Individualized approach for food/nutrient provision.   Outcome: Met This Shift [FreeTextEntry1] : 53yo here today for wwe---refill on gabapentin, mammo sono, due for colonoscopy in 5 years

## 2024-03-22 NOTE — PHYSICAL EXAM
[Appropriately responsive] : appropriately responsive [Alert] : alert [No Acute Distress] : no acute distress [No Lymphadenopathy] : no lymphadenopathy [Soft] : soft [Non-tender] : non-tender [Non-distended] : non-distended [No HSM] : No HSM [No Lesions] : no lesions [No Mass] : no mass [Oriented x3] : oriented x3 [Examination Of The Breasts] : a normal appearance [No Masses] : no breast masses were palpable [Labia Majora] : normal [Labia Minora] : normal [Normal] : normal [Absent] : absent [Uterine Adnexae] : non-palpable

## 2024-03-25 LAB
C TRACH RRNA SPEC QL NAA+PROBE: NOT DETECTED
CANDIDA VAG CYTO: NOT DETECTED
G VAGINALIS+PREV SP MTYP VAG QL MICRO: NOT DETECTED
HPV HIGH+LOW RISK DNA PNL CVX: NOT DETECTED
N GONORRHOEA RRNA SPEC QL NAA+PROBE: NOT DETECTED
SOURCE AMPLIFICATION: NORMAL
T VAGINALIS VAG QL WET PREP: NOT DETECTED

## 2024-03-28 LAB — CYTOLOGY CVX/VAG DOC THIN PREP: ABNORMAL

## 2024-04-02 ENCOUNTER — TRANSCRIPTION ENCOUNTER (OUTPATIENT)
Age: 54
End: 2024-04-02

## 2024-04-04 ENCOUNTER — TRANSCRIPTION ENCOUNTER (OUTPATIENT)
Age: 54
End: 2024-04-04

## 2024-04-08 ENCOUNTER — APPOINTMENT (OUTPATIENT)
Dept: INTERNAL MEDICINE | Facility: CLINIC | Age: 54
End: 2024-04-08
Payer: MEDICAID

## 2024-04-08 VITALS
OXYGEN SATURATION: 98 % | HEART RATE: 93 BPM | SYSTOLIC BLOOD PRESSURE: 142 MMHG | DIASTOLIC BLOOD PRESSURE: 77 MMHG | WEIGHT: 154 LBS | BODY MASS INDEX: 30.08 KG/M2 | TEMPERATURE: 97.7 F

## 2024-04-08 VITALS — SYSTOLIC BLOOD PRESSURE: 116 MMHG | DIASTOLIC BLOOD PRESSURE: 70 MMHG

## 2024-04-08 DIAGNOSIS — E11.9 TYPE 2 DIABETES MELLITUS W/OUT COMPLICATIONS: ICD-10-CM

## 2024-04-08 DIAGNOSIS — K59.00 CONSTIPATION, UNSPECIFIED: ICD-10-CM

## 2024-04-08 DIAGNOSIS — E66.9 OBESITY, UNSPECIFIED: ICD-10-CM

## 2024-04-08 DIAGNOSIS — E56.9 VITAMIN DEFICIENCY, UNSPECIFIED: ICD-10-CM

## 2024-04-08 DIAGNOSIS — E03.9 HYPOTHYROIDISM, UNSPECIFIED: ICD-10-CM

## 2024-04-08 PROCEDURE — 99214 OFFICE O/P EST MOD 30 MIN: CPT

## 2024-04-08 RX ORDER — METFORMIN HYDROCHLORIDE 500 MG/1
500 TABLET, COATED ORAL
Qty: 360 | Refills: 3 | Status: ACTIVE | COMMUNITY
Start: 2018-06-11

## 2024-04-08 RX ORDER — SEMAGLUTIDE 0.68 MG/ML
2 INJECTION, SOLUTION SUBCUTANEOUS
Qty: 1 | Refills: 3 | Status: COMPLETED | COMMUNITY
Start: 2024-01-08 | End: 2024-04-08

## 2024-04-08 NOTE — HISTORY OF PRESENT ILLNESS
[de-identified] : 54 y.o. female, PMHx diabetes II, obesity, alcohol misuse (in recovery, on naltrexone), reactive airway, GERD, low back pain, anxiety, depression. Presents for follow up.   Had started Ozempic after our last visit to assist with diabetes control and weight.  Felt well with it, but with occasional constipation. The last week, however, constipation was worse.  Went 4 days with no BM, took Dulcolax with good effect, sometimes takes several days to work.  Gets upper, left abdominal discomfort with more constipation.  Last dose of Ozempic was 1.5 weeks ago.   Does note increased stress level with family issues.  Has had GI symptoms related to stress in the past.   Was doing daily Metamucil and stool softener but had stopped for a while and GI health seemed - no constipation, no heartburn.   Lost about 10 pounds.  Has been conscious of healthier eating, less snacks.  No late night eating other than small piece of fruit.  Lots of water.  More walking.   Continues group f/u with psychiatry and therapy. Continues 80 mg fluoxetine, which she has found helpful.  PRN hydroxyzine for sleep. Sobor for almost 4 years.    Products Recommended: [employer] General Sunscreen Counseling: I recommended a broad spectrum sunscreen with a SPF of 30 or higher. I explained that SPF 30 sunscreens block approximately 97 percent of the sun's harmful rays. Sunscreens should be applied at least 15 minutes prior to expected sun exposure and then every 2 hours after that as long as sun exposure continues. If swimming or exercising sunscreen should be reapplied every 45 minutes to an hour after getting wet or sweating. One ounce, or the equivalent of a shot glass full of sunscreen, is adequate to protect the skin not covered by a bathing suit. I also recommended a lip balm with a sunscreen as well. Sun protective clothing can be used in lieu of sunscreen but must be worn the entire time you are exposed to the sun's rays. Detail Level: Detailed

## 2024-04-08 NOTE — ASSESSMENT
[FreeTextEntry1] : --- HM: Breast cancer screening - mammo + sono 6/2023, BIRADs 2 CRS - colonoscopy 2019, due for 5 yr repeat Skin cancer screening - done with derm Bone density 4/2021 normal Cervical cancer screening - pap cotesting 1/2023, negative Dental UTD tdap UTD - 2023 Shingrix vaccine completed 2020 Flu shot today check labs  DM2: Last A1C elevated (11).  Repeat today.   Ozempic x 2 months, stopped now d/t constipation.  Taking metformin 1000mg once daily  Regular f/u with ophthalmology Working on improving dietary habits again Has increased activity  Started statin  Obesity: Lost weight with dietary changes, trying to add in more activity  Stopped Ozempic for now d/t constipation  Constipation: Possible IBS (lots of stress recently) vs. Ozempic side effect Better off ozempic and with adding back in daily fiber supplement Monitor GI f/u if not resolving, colonoscopy due end of the year   Hypothyroid: Stable

## 2024-04-08 NOTE — PHYSICAL EXAM
[No Acute Distress] : no acute distress [Well-Appearing] : well-appearing [Normal Voice/Communication] : normal voice/communication [No Carotid Bruits] : no carotid bruits [Pedal Pulses Present] : the pedal pulses are present [No Edema] : there was no peripheral edema [Normal] : normal gait, coordination grossly intact, no focal deficits and deep tendon reflexes were 2+ and symmetric

## 2024-04-10 ENCOUNTER — TRANSCRIPTION ENCOUNTER (OUTPATIENT)
Age: 54
End: 2024-04-10

## 2024-04-10 LAB
25(OH)D3 SERPL-MCNC: 38.4 NG/ML
ALBUMIN SERPL ELPH-MCNC: 4.8 G/DL
ALP BLD-CCNC: 87 U/L
ALT SERPL-CCNC: 35 U/L
ANION GAP SERPL CALC-SCNC: 12 MMOL/L
AST SERPL-CCNC: 24 U/L
BASOPHILS # BLD AUTO: 0.02 K/UL
BASOPHILS NFR BLD AUTO: 0.3 %
BILIRUB SERPL-MCNC: 0.2 MG/DL
BUN SERPL-MCNC: 11 MG/DL
CALCIUM SERPL-MCNC: 9.7 MG/DL
CHLORIDE SERPL-SCNC: 97 MMOL/L
CHOLEST SERPL-MCNC: 144 MG/DL
CO2 SERPL-SCNC: 27 MMOL/L
CREAT SERPL-MCNC: 0.69 MG/DL
EGFR: 102 ML/MIN/1.73M2
EOSINOPHIL # BLD AUTO: 0.09 K/UL
EOSINOPHIL NFR BLD AUTO: 1.5 %
ESTIMATED AVERAGE GLUCOSE: 171 MG/DL
FERRITIN SERPL-MCNC: 119 NG/ML
GLUCOSE SERPL-MCNC: 128 MG/DL
HBA1C MFR BLD HPLC: 7.6 %
HCT VFR BLD CALC: 41.5 %
HDLC SERPL-MCNC: 48 MG/DL
HGB BLD-MCNC: 13.3 G/DL
IMM GRANULOCYTES NFR BLD AUTO: 0.3 %
IRON SATN MFR SERPL: 33 %
IRON SERPL-MCNC: 94 UG/DL
LDLC SERPL CALC-MCNC: 70 MG/DL
LYMPHOCYTES # BLD AUTO: 1.9 K/UL
LYMPHOCYTES NFR BLD AUTO: 31.6 %
MAGNESIUM SERPL-MCNC: 2.1 MG/DL
MAN DIFF?: NORMAL
MCHC RBC-ENTMCNC: 27.4 PG
MCHC RBC-ENTMCNC: 32 GM/DL
MCV RBC AUTO: 85.4 FL
MONOCYTES # BLD AUTO: 0.44 K/UL
MONOCYTES NFR BLD AUTO: 7.3 %
NEUTROPHILS # BLD AUTO: 3.55 K/UL
NEUTROPHILS NFR BLD AUTO: 59 %
NONHDLC SERPL-MCNC: 96 MG/DL
PLATELET # BLD AUTO: 315 K/UL
POTASSIUM SERPL-SCNC: 4.9 MMOL/L
PROT SERPL-MCNC: 7.5 G/DL
RBC # BLD: 4.86 M/UL
RBC # FLD: 13.2 %
SODIUM SERPL-SCNC: 136 MMOL/L
TIBC SERPL-MCNC: 285 UG/DL
TRIGL SERPL-MCNC: 154 MG/DL
TSH SERPL-ACNC: 0.31 UIU/ML
UIBC SERPL-MCNC: 191 UG/DL
VIT B12 SERPL-MCNC: 692 PG/ML
WBC # FLD AUTO: 6.02 K/UL

## 2024-04-16 ENCOUNTER — EMERGENCY (EMERGENCY)
Facility: HOSPITAL | Age: 54
LOS: 1 days | Discharge: HOME | End: 2024-04-16
Attending: EMERGENCY MEDICINE | Admitting: EMERGENCY MEDICINE
Payer: COMMERCIAL

## 2024-04-16 VITALS
DIASTOLIC BLOOD PRESSURE: 84 MMHG | HEART RATE: 74 BPM | OXYGEN SATURATION: 98 % | SYSTOLIC BLOOD PRESSURE: 146 MMHG | RESPIRATION RATE: 18 BRPM | TEMPERATURE: 98 F

## 2024-04-16 VITALS
SYSTOLIC BLOOD PRESSURE: 138 MMHG | HEART RATE: 79 BPM | RESPIRATION RATE: 16 BRPM | OXYGEN SATURATION: 98 % | WEIGHT: 153 LBS | TEMPERATURE: 98 F | DIASTOLIC BLOOD PRESSURE: 74 MMHG | HEIGHT: 60 IN

## 2024-04-16 DIAGNOSIS — Z90.89 ACQUIRED ABSENCE OF OTHER ORGANS: Chronic | ICD-10-CM

## 2024-04-16 DIAGNOSIS — Z98.890 OTHER SPECIFIED POSTPROCEDURAL STATES: Chronic | ICD-10-CM

## 2024-04-16 LAB
ALBUMIN SERPL ELPH-MCNC: 4.1 G/DL — SIGNIFICANT CHANGE UP (ref 3.3–5)
ALP SERPL-CCNC: 68 U/L — SIGNIFICANT CHANGE UP (ref 30–120)
ALT FLD-CCNC: 38 U/L — SIGNIFICANT CHANGE UP (ref 10–60)
ANION GAP SERPL CALC-SCNC: 8 MMOL/L — SIGNIFICANT CHANGE UP (ref 5–17)
AST SERPL-CCNC: 17 U/L — SIGNIFICANT CHANGE UP (ref 10–40)
BASOPHILS # BLD AUTO: 0.04 K/UL — SIGNIFICANT CHANGE UP (ref 0–0.2)
BASOPHILS NFR BLD AUTO: 0.3 % — SIGNIFICANT CHANGE UP (ref 0–2)
BILIRUB SERPL-MCNC: 0.4 MG/DL — SIGNIFICANT CHANGE UP (ref 0.2–1.2)
BUN SERPL-MCNC: 13 MG/DL — SIGNIFICANT CHANGE UP (ref 7–23)
CALCIUM SERPL-MCNC: 9.2 MG/DL — SIGNIFICANT CHANGE UP (ref 8.4–10.5)
CHLORIDE SERPL-SCNC: 99 MMOL/L — SIGNIFICANT CHANGE UP (ref 96–108)
CO2 SERPL-SCNC: 28 MMOL/L — SIGNIFICANT CHANGE UP (ref 22–31)
CREAT SERPL-MCNC: 0.75 MG/DL — SIGNIFICANT CHANGE UP (ref 0.5–1.3)
EGFR: 94 ML/MIN/1.73M2 — SIGNIFICANT CHANGE UP
EOSINOPHIL # BLD AUTO: 0.04 K/UL — SIGNIFICANT CHANGE UP (ref 0–0.5)
EOSINOPHIL NFR BLD AUTO: 0.3 % — SIGNIFICANT CHANGE UP (ref 0–6)
GLUCOSE SERPL-MCNC: 124 MG/DL — HIGH (ref 70–99)
HCG SERPL-ACNC: 3 MIU/ML — SIGNIFICANT CHANGE UP
HCT VFR BLD CALC: 40.8 % — SIGNIFICANT CHANGE UP (ref 34.5–45)
HGB BLD-MCNC: 13.4 G/DL — SIGNIFICANT CHANGE UP (ref 11.5–15.5)
IMM GRANULOCYTES NFR BLD AUTO: 0.5 % — SIGNIFICANT CHANGE UP (ref 0–0.9)
LIDOCAIN IGE QN: 59 U/L — SIGNIFICANT CHANGE UP (ref 16–77)
LYMPHOCYTES # BLD AUTO: 15.8 % — SIGNIFICANT CHANGE UP (ref 13–44)
LYMPHOCYTES # BLD AUTO: 2.04 K/UL — SIGNIFICANT CHANGE UP (ref 1–3.3)
MCHC RBC-ENTMCNC: 27.4 PG — SIGNIFICANT CHANGE UP (ref 27–34)
MCHC RBC-ENTMCNC: 32.8 GM/DL — SIGNIFICANT CHANGE UP (ref 32–36)
MCV RBC AUTO: 83.4 FL — SIGNIFICANT CHANGE UP (ref 80–100)
MONOCYTES # BLD AUTO: 0.77 K/UL — SIGNIFICANT CHANGE UP (ref 0–0.9)
MONOCYTES NFR BLD AUTO: 6 % — SIGNIFICANT CHANGE UP (ref 2–14)
NEUTROPHILS # BLD AUTO: 9.99 K/UL — HIGH (ref 1.8–7.4)
NEUTROPHILS NFR BLD AUTO: 77.1 % — HIGH (ref 43–77)
NRBC # BLD: 0 /100 WBCS — SIGNIFICANT CHANGE UP (ref 0–0)
PLATELET # BLD AUTO: 372 K/UL — SIGNIFICANT CHANGE UP (ref 150–400)
POTASSIUM SERPL-MCNC: 4.1 MMOL/L — SIGNIFICANT CHANGE UP (ref 3.5–5.3)
POTASSIUM SERPL-SCNC: 4.1 MMOL/L — SIGNIFICANT CHANGE UP (ref 3.5–5.3)
PROT SERPL-MCNC: 7.6 G/DL — SIGNIFICANT CHANGE UP (ref 6–8.3)
RBC # BLD: 4.89 M/UL — SIGNIFICANT CHANGE UP (ref 3.8–5.2)
RBC # FLD: 13.2 % — SIGNIFICANT CHANGE UP (ref 10.3–14.5)
SODIUM SERPL-SCNC: 135 MMOL/L — SIGNIFICANT CHANGE UP (ref 135–145)
TROPONIN I, HIGH SENSITIVITY RESULT: <4 NG/L — SIGNIFICANT CHANGE UP
WBC # BLD: 12.94 K/UL — HIGH (ref 3.8–10.5)
WBC # FLD AUTO: 12.94 K/UL — HIGH (ref 3.8–10.5)

## 2024-04-16 PROCEDURE — 93005 ELECTROCARDIOGRAM TRACING: CPT

## 2024-04-16 PROCEDURE — 85025 COMPLETE CBC W/AUTO DIFF WBC: CPT

## 2024-04-16 PROCEDURE — 96375 TX/PRO/DX INJ NEW DRUG ADDON: CPT

## 2024-04-16 PROCEDURE — 93010 ELECTROCARDIOGRAM REPORT: CPT

## 2024-04-16 PROCEDURE — 96374 THER/PROPH/DIAG INJ IV PUSH: CPT | Mod: XU

## 2024-04-16 PROCEDURE — 76705 ECHO EXAM OF ABDOMEN: CPT

## 2024-04-16 PROCEDURE — 99285 EMERGENCY DEPT VISIT HI MDM: CPT | Mod: 25

## 2024-04-16 PROCEDURE — 83690 ASSAY OF LIPASE: CPT

## 2024-04-16 PROCEDURE — 99285 EMERGENCY DEPT VISIT HI MDM: CPT

## 2024-04-16 PROCEDURE — 76705 ECHO EXAM OF ABDOMEN: CPT | Mod: 26

## 2024-04-16 PROCEDURE — 74177 CT ABD & PELVIS W/CONTRAST: CPT | Mod: 26,MC

## 2024-04-16 PROCEDURE — 74177 CT ABD & PELVIS W/CONTRAST: CPT | Mod: MC

## 2024-04-16 PROCEDURE — 84484 ASSAY OF TROPONIN QUANT: CPT

## 2024-04-16 PROCEDURE — 84702 CHORIONIC GONADOTROPIN TEST: CPT

## 2024-04-16 PROCEDURE — 80053 COMPREHEN METABOLIC PANEL: CPT

## 2024-04-16 PROCEDURE — 36415 COLL VENOUS BLD VENIPUNCTURE: CPT

## 2024-04-16 RX ORDER — ONDANSETRON 8 MG/1
1 TABLET, FILM COATED ORAL
Qty: 12 | Refills: 0
Start: 2024-04-16 | End: 2024-04-18

## 2024-04-16 RX ORDER — ONDANSETRON 8 MG/1
4 TABLET, FILM COATED ORAL ONCE
Refills: 0 | Status: COMPLETED | OUTPATIENT
Start: 2024-04-16 | End: 2024-04-16

## 2024-04-16 RX ORDER — KETOROLAC TROMETHAMINE 30 MG/ML
30 SYRINGE (ML) INJECTION ONCE
Refills: 0 | Status: DISCONTINUED | OUTPATIENT
Start: 2024-04-16 | End: 2024-04-16

## 2024-04-16 RX ORDER — IOHEXOL 300 MG/ML
30 INJECTION, SOLUTION INTRAVENOUS ONCE
Refills: 0 | Status: COMPLETED | OUTPATIENT
Start: 2024-04-16 | End: 2024-04-16

## 2024-04-16 RX ORDER — OMEPRAZOLE 10 MG/1
1 CAPSULE, DELAYED RELEASE ORAL
Qty: 14 | Refills: 0
Start: 2024-04-16 | End: 2024-04-29

## 2024-04-16 RX ORDER — SODIUM CHLORIDE 9 MG/ML
1000 INJECTION INTRAMUSCULAR; INTRAVENOUS; SUBCUTANEOUS ONCE
Refills: 0 | Status: COMPLETED | OUTPATIENT
Start: 2024-04-16 | End: 2024-04-16

## 2024-04-16 RX ADMIN — Medication 30 MILLIGRAM(S): at 19:16

## 2024-04-16 RX ADMIN — IOHEXOL 30 MILLILITER(S): 300 INJECTION, SOLUTION INTRAVENOUS at 16:20

## 2024-04-16 RX ADMIN — SODIUM CHLORIDE 1000 MILLILITER(S): 9 INJECTION INTRAMUSCULAR; INTRAVENOUS; SUBCUTANEOUS at 16:15

## 2024-04-16 RX ADMIN — ONDANSETRON 4 MILLIGRAM(S): 8 TABLET, FILM COATED ORAL at 16:14

## 2024-04-16 RX ADMIN — Medication 30 MILLIGRAM(S): at 16:14

## 2024-04-16 NOTE — ED PROVIDER NOTE - OBJECTIVE STATEMENT
54-year-old female with history of diabetes melitis, hypothyroidism, and seasonal allergies presents with upper abdominal discomfort for the past month and some constipation.  Patient reports being put on rosuvastatin and Ozempic in January.  Started to have pain and discomfort last month.  Stop the Ozempic and the Rosuvastatin.  Continues to have pain and discomfort.  States she continues to feel bloated and "her stomach feels off".  Last bowel movement was today but took a Dulcolax to have a bowel movement. States she has not been having regular bowel movements past month.  Pain currently 7 out of 10.  Denies any urinary symptoms or flank pain.  Slight nausea, no vomiting.  Denies any fever or chills.  No chest pain or shortness of breath.  Previous abdominal surgeries include hysterectomy and having a lipoma removed. Has appt with GI scheduled in 3 days   PCP Kourtney Cesar (MICHAEL)   GI (first appt in 3 days) KATE Tripathi

## 2024-04-16 NOTE — ED PROVIDER NOTE - NSICDXPASTMEDICALHX_GEN_ALL_CORE_FT
PAST MEDICAL HISTORY:  DM2 (diabetes mellitus, type 2)     Excessive and frequent menstruation with regular cycle     H/O: depression     History of 2019 novel coronavirus disease (COVID-19) 12/2021 - long term fatigue - no respiratory complications    Hypothyroidism     Seasonal allergies

## 2024-04-16 NOTE — ED PROVIDER NOTE - GENITOURINARY, MLM
Comfort Care Not  Chart reviewed, updated by bedside RN. Family not present at this time. Comfort Care will continue to follow infant and make contact with family to offer support.     no CVA tenderness

## 2024-04-16 NOTE — ED PROVIDER NOTE - ATTENDING APP SHARED VISIT CONTRIBUTION OF CARE
Patient complaining of upper abdominal pain nausea and constipation worsening for the past month.  Patient denies fevers chills chest pain short of breath cough vomiting or urinary complaints.    Plan EKG labs CT abdomen pelvis Toradol Zofran IV fluids    Differential including but not limited to SBO pancreatitis gastritis enteritis cholecystitis MI constipation

## 2024-04-16 NOTE — ED PROVIDER NOTE - CARE PROVIDER_API CALL
Naresh Crawford  Gastroenterology  [address], [address]  [place] [address]  Phone: [phone]  Fax: [phone]  Follow Up Time: 1-3 Days   Naresh Crawford  Gastroenterology  [address], [address]  [place] [address]  Phone: [phone]  Fax: [phone]  Follow Up Time: 1-3 Days    Rafat Medeiros  Surgery  [address] [place] [address]  Phone: [phone]  Fax: [phone]  Follow Up Time: 1-3 Days

## 2024-04-16 NOTE — ED ADULT NURSE NOTE - NSFALLUNIVINTERV_ED_ALL_ED
Bed/Stretcher in lowest position, wheels locked, appropriate side rails in place/Call bell, personal items and telephone in reach/Instruct patient to call for assistance before getting out of bed/chair/stretcher/Non-slip footwear applied when patient is off stretcher/[place] to call system/Physically safe environment - no spills, clutter or unnecessary equipment/Purposeful proactive rounding/Room/bathroom lighting operational, light cord in reach

## 2024-04-16 NOTE — ED ADULT NURSE NOTE - CAS EDN DISCHARGE ASSESSMENT
-- DO NOT REPLY / DO NOT REPLY ALL --  -- Message is from the [facility]--    Provider paged via [employer] Documentation - The below message was copied and pasted from a [employer] page:    12/3/2020 3:09 pm   Message Sent Date/Time 12/3/2020 3:12 pm   Source [facility] Contact [facility]   Department [facility]   Method Secure Text   Contacted Kym Dias MD   Details Patient   Message   [phone] [facility] CALLER NAME: MOY CHEN RE: MOY CHEN PATIENT 1974 PATIENT PCP: KYM DIAS PATIENT NEEDS A CALL BACK REGARIDNG A GI SPECIALISTS FOR A FECAL PROCEDURE; PATIENT STATED THAT SHE WAS REFEERED PATIENT DO A GI SPEICALIST WHO DOES NOT DO THE PROCEDURE; PATIENT NEEDS A CALL BACK REGARDING THE MATTER AS SOON AS POSSIBLE.LORELEI        Alert and oriented to person, place and time

## 2024-04-16 NOTE — ED PROVIDER NOTE - PROVIDER TOKENS
PROVIDER:[TOKEN:[[id number]],FOLLOWUP:[1-3 Days]] PROVIDER:[TOKEN:[[id number]],FOLLOWUP:[1-3 Days]],PROVIDER:[TOKEN:[[id number]],FOLLOWUP:[1-3 Days]]

## 2024-04-16 NOTE — ED PROVIDER NOTE - DIFFERENTIAL DIAGNOSIS
Differential including but not limited to SBO pancreatitis gastritis enteritis cholecystitis MI constipation Differential Diagnosis

## 2024-04-16 NOTE — ED PROVIDER NOTE - NSFOLLOWUPINSTRUCTIONS_ED_ALL_ED_FT
[place] diet as discussed  Zofran every 6 8 hours as needed for nausea  Omeprazole once a day next 2 weeks  Bentyl every 6 8 hours as needed for abdominal cramping  Follow-up with GI this week as scheduled  Also recommend follow-up with general surgeon, referral provided if needed      Abdominal Pain, Adult  A health care provider talking to a person during a medical exam.  Pain in the abdomen (abdominal pain) can be caused by many things. In most cases, it gets better with no treatment or by being treated at home. But in some cases, it can be serious.    Your health care provider will ask questions about your medical history and do a physical exam to try to figure out what is causing your pain.    Follow these instructions at home:  Medicines    Take over-the-counter and prescription medicines only as told by your provider.  Do not take medicines that help you poop (laxatives) unless told by your provider.  General instructions    Watch your condition for any changes.  Drink enough fluid to keep your pee (urine) pale yellow.  Contact a health care provider if:  Your pain changes, gets worse, or lasts longer than expected.  You have severe cramping or bloating in your abdomen, or you vomit.  Your pain gets worse with meals, after eating, or with certain foods.  You are constipated or have diarrhea for more than 2–3 days.  You are not hungry, or you lose weight without trying.  You have signs of dehydration. These may include:  Dark pee, very little pee, or no pee.  Cracked lips or dry mouth.  Sleepiness or weakness.  You have pain when you pee (urinate) or poop.  Your abdominal pain wakes you up at night.  You have blood in your pee.  You have a fever.  Get help right away if:  You cannot stop vomiting.  Your pain is only in one part of the abdomen. Pain on the right side could be caused by appendicitis.  You have bloody or black poop (stool), or poop that looks like tar.  You have trouble breathing.  You have chest pain.  These symptoms may be an emergency. Get help right away. Call 911.  Do not wait to see if the symptoms will go away.  Do not drive yourself to the hospital.  This information is not intended to replace advice given to you by your health care provider. Make sure you discuss any questions you have with your health care provider.

## 2024-04-16 NOTE — ED ADULT NURSE NOTE - OBJECTIVE STATEMENT
received pt in assigned room a&xo3 c/o upper abd pain x1 month associated with nausea & constipation, pt states she had diarrhea today bc she took a laxative, denies any flank pain, fever , or urinary s/s, skin intat, reps even and non labored, ivl placed labs drawn and sent, medicated per MD orders, NS bolus in progress, drinking Omnipaque, pending ct

## 2024-04-16 NOTE — ED ADULT NURSE NOTE - NSFALLCONCLUSION_ED_ALL_ED
Include Location In Plan?: No
Detail Level: Detailed
Detail Level: Generalized
Universal Safety Interventions

## 2024-04-16 NOTE — ED PROVIDER NOTE - CARE PROVIDERS DIRECT ADDRESSES
[email or web address]

## 2024-04-16 NOTE — ED PROVIDER NOTE - PROGRESS NOTE DETAILS
Patient stable.  Overall pain much improved.  No abdominal tenderness on repeat exam.  Ultrasound was ordered which showed gallstones.  No evidence of cholecystitis.  No tenderness to right upper quadrant.  Patient has follow-up with GI scheduled.  Also recommend follow-up with general surgeon.  Referral provided.  Overall appears well.  Copies of labs, CT, and ultrasound results provided to patient.

## 2024-04-16 NOTE — ED ADULT TRIAGE NOTE - PAIN RATING/NUMBER SCALE (0-10): ACTIVITY
7 (severe pain)
General:  Well appearing, no distress  HEENT:  No conjunctival injection, neck supple, no congestion   Chest:  Non-tender, no crepitance  Lungs:  Clear to auscultation bilaterally   Heart:  s1s2 normal, no murmur  Abdomen:  soft, mild epigastric tenderness  :  Deferred  Rectal:  Deferred  Extremities: No edema, normal perfusion, no joint swelling or tenderness  Neuro:  Alert, conversant, motor/sensory grossly intact   Psychiatry:  Calm, cooperative, no expression of suicidal or homicidal ideation

## 2024-04-16 NOTE — ED PROVIDER NOTE - PATIENT PORTAL LINK FT
You can access the FollowMyHealth Patient Portal offered by [facility] by registering at the following website: [email or web address]. By joining [employer]’s FollowMyHealth portal, you will also be able to view your health information using other applications (apps) compatible with our system.

## 2024-05-15 ENCOUNTER — APPOINTMENT (OUTPATIENT)
Dept: MAMMOGRAPHY | Facility: CLINIC | Age: 54
End: 2024-05-15
Payer: MEDICAID

## 2024-05-15 ENCOUNTER — RESULT REVIEW (OUTPATIENT)
Age: 54
End: 2024-05-15

## 2024-05-15 ENCOUNTER — OUTPATIENT (OUTPATIENT)
Dept: OUTPATIENT SERVICES | Facility: HOSPITAL | Age: 54
LOS: 1 days | End: 2024-05-15
Payer: COMMERCIAL

## 2024-05-15 ENCOUNTER — APPOINTMENT (OUTPATIENT)
Dept: ULTRASOUND IMAGING | Facility: CLINIC | Age: 54
End: 2024-05-15
Payer: MEDICAID

## 2024-05-15 DIAGNOSIS — R92.8 OTHER ABNORMAL AND INCONCLUSIVE FINDINGS ON DIAGNOSTIC IMAGING OF BREAST: ICD-10-CM

## 2024-05-15 DIAGNOSIS — Z90.89 ACQUIRED ABSENCE OF OTHER ORGANS: Chronic | ICD-10-CM

## 2024-05-15 DIAGNOSIS — Z00.8 ENCOUNTER FOR OTHER GENERAL EXAMINATION: ICD-10-CM

## 2024-05-15 DIAGNOSIS — Z98.890 OTHER SPECIFIED POSTPROCEDURAL STATES: Chronic | ICD-10-CM

## 2024-05-15 PROCEDURE — 77063 BREAST TOMOSYNTHESIS BI: CPT | Mod: 26

## 2024-05-15 PROCEDURE — 77067 SCR MAMMO BI INCL CAD: CPT | Mod: 26

## 2024-05-15 PROCEDURE — 77063 BREAST TOMOSYNTHESIS BI: CPT

## 2024-05-15 PROCEDURE — 76641 ULTRASOUND BREAST COMPLETE: CPT | Mod: 26,50

## 2024-05-15 PROCEDURE — 77067 SCR MAMMO BI INCL CAD: CPT

## 2024-05-15 PROCEDURE — 76641 ULTRASOUND BREAST COMPLETE: CPT

## 2024-05-16 ENCOUNTER — NON-APPOINTMENT (OUTPATIENT)
Age: 54
End: 2024-05-16

## 2024-06-11 ENCOUNTER — APPOINTMENT (OUTPATIENT)
Dept: OPHTHALMOLOGY | Facility: CLINIC | Age: 54
End: 2024-06-11

## 2024-07-03 ENCOUNTER — RX RENEWAL (OUTPATIENT)
Age: 54
End: 2024-07-03

## 2024-07-22 ENCOUNTER — TRANSCRIPTION ENCOUNTER (OUTPATIENT)
Age: 54
End: 2024-07-22

## 2024-07-23 ENCOUNTER — TRANSCRIPTION ENCOUNTER (OUTPATIENT)
Age: 54
End: 2024-07-23

## 2024-07-29 ENCOUNTER — TRANSCRIPTION ENCOUNTER (OUTPATIENT)
Age: 54
End: 2024-07-29

## 2024-08-20 ENCOUNTER — RX RENEWAL (OUTPATIENT)
Age: 54
End: 2024-08-20

## 2024-09-09 ENCOUNTER — APPOINTMENT (OUTPATIENT)
Dept: INTERNAL MEDICINE | Facility: CLINIC | Age: 54
End: 2024-09-09

## 2024-09-09 VITALS
OXYGEN SATURATION: 98 % | SYSTOLIC BLOOD PRESSURE: 120 MMHG | DIASTOLIC BLOOD PRESSURE: 74 MMHG | BODY MASS INDEX: 30.63 KG/M2 | WEIGHT: 156 LBS | HEIGHT: 60 IN | HEART RATE: 76 BPM | TEMPERATURE: 97.7 F

## 2024-09-09 DIAGNOSIS — E66.9 OBESITY, UNSPECIFIED: ICD-10-CM

## 2024-09-09 DIAGNOSIS — E11.9 TYPE 2 DIABETES MELLITUS W/OUT COMPLICATIONS: ICD-10-CM

## 2024-09-09 DIAGNOSIS — J45.909 UNSPECIFIED ASTHMA, UNCOMPLICATED: ICD-10-CM

## 2024-09-09 DIAGNOSIS — Z00.00 ENCOUNTER FOR GENERAL ADULT MEDICAL EXAMINATION W/OUT ABNORMAL FINDINGS: ICD-10-CM

## 2024-09-09 PROCEDURE — 90656 IIV3 VACC NO PRSV 0.5 ML IM: CPT

## 2024-09-09 PROCEDURE — G0008: CPT

## 2024-09-09 PROCEDURE — G0444 DEPRESSION SCREEN ANNUAL: CPT | Mod: 59

## 2024-09-09 PROCEDURE — 99214 OFFICE O/P EST MOD 30 MIN: CPT | Mod: 25

## 2024-09-09 RX ORDER — BUPROPION HYDROCHLORIDE 150 MG/1
150 TABLET, EXTENDED RELEASE ORAL DAILY
Refills: 0 | Status: ACTIVE | COMMUNITY
Start: 2024-09-09

## 2024-09-09 NOTE — HISTORY OF PRESENT ILLNESS
[FreeTextEntry1] : Establish care and follow up for chronic conditions [de-identified] : 55 yo F w PMH DM2, hypothyroidism, obesity, hx etoh abuse, anxiety, and depression now presenting for follow up of chronic medical conditions.  She was last seen in this clinic in April 2024. Since that visit she has had issues with constipation thought initially to be due to Ozempic which she stopped earlier this year. She visited the ED for this issue and followed w GI at [facility] in June for constipation with unremarkable workup. She is now having BMs daily.

## 2024-09-09 NOTE — HEALTH RISK ASSESSMENT
[No] : In the past 12 months have you used drugs other than those required for medical reasons? No [No falls in past year] : Patient reported no falls in the past year [0] : 2) Feeling down, depressed, or hopeless: Not at all (0) [PHQ-2 Negative - No further assessment needed] : PHQ-2 Negative - No further assessment needed [Former] : Former [de-identified] : n/a [de-identified] : stopped alcohol use in 2020; currently in rehab program [Audit-CScore] : 0 [de-identified] : marijuana twice weekly [de-identified] : limited physical activity [de-identified] : protein w vegetables; limits carbs [[id number]] : 0 [de-identified] : 2 pack year history; quit 18 years

## 2024-09-09 NOTE — ASSESSMENT
[FreeTextEntry1] : 55 yo F w PMH DM2, hypothyroidism, obesity, hx etoh abuse, anxiety, and depression now presenting for follow up of chronic medical conditions. Routine labs performed. Follow up for CPE in 6 months.   Healthcare Maintenance:  Medical history reviewed and updated Medication reconciliation done Routine labs ordered Vaccinations reviewed; Tdap 2023; shingrix x2 done 2020; influenza done today Breast imaging ordered; last in 5/2024 unremarkable Follow up with GYN as needed; s/p hysterectomy for fibroids; last pap 3/2024 WNL Colon cancer screening; last in 2019 and told to repeat in 5 years (deferred for now) Stay up to date with ophthalmology and dental Diet and exercise encouraged  Skin Lesion -referral to derm for annual skin check  DM2 -repeat A1C -currently on metformin 1000mg daily  Hypothyroidism -repeat TSH -currently on levothyroxine 125mcg daily  Depression/Anxiety/Hx ETOH use disorder -following at rehab program; considering graduating from program -currently on fluoxetine and wellbutrin (prescribed by program but needs to confirm dosing)

## 2024-09-09 NOTE — PHYSICAL EXAM
[No Acute Distress] : no acute distress [No Respiratory Distress] : no respiratory distress  [Clear to Auscultation] : lungs were clear to auscultation bilaterally [Normal Rate] : normal rate  [Normal S1, S2] : normal S1 and S2 [No Murmur] : no murmur heard [No Edema] : there was no peripheral edema [Soft] : abdomen soft [Normal] : soft, non-tender, non-distended, no masses palpated, no HSM and normal bowel sounds [de-identified] : mole on L anterior abdominal wall

## 2024-09-10 ENCOUNTER — TRANSCRIPTION ENCOUNTER (OUTPATIENT)
Age: 54
End: 2024-09-10

## 2024-09-10 DIAGNOSIS — E78.5 HYPERLIPIDEMIA, UNSPECIFIED: ICD-10-CM

## 2024-09-10 LAB
ALBUMIN SERPL ELPH-MCNC: 4.4 G/DL
ALP BLD-CCNC: 85 U/L
ALT SERPL-CCNC: 19 U/L
ANION GAP SERPL CALC-SCNC: 12 MMOL/L
AST SERPL-CCNC: 15 U/L
BASOPHILS # BLD AUTO: 0.06 K/UL
BASOPHILS NFR BLD AUTO: 0.9 %
BILIRUB SERPL-MCNC: 0.2 MG/DL
BUN SERPL-MCNC: 12 MG/DL
CALCIUM SERPL-MCNC: 9.4 MG/DL
CHLORIDE SERPL-SCNC: 99 MMOL/L
CHOLEST SERPL-MCNC: 287 MG/DL
CO2 SERPL-SCNC: 25 MMOL/L
CREAT SERPL-MCNC: 0.68 MG/DL
EGFR: 103 ML/MIN/1.73M2
EOSINOPHIL # BLD AUTO: 0.21 K/UL
EOSINOPHIL NFR BLD AUTO: 3 %
ESTIMATED AVERAGE GLUCOSE: 177 MG/DL
GLUCOSE SERPL-MCNC: 194 MG/DL
HBA1C MFR BLD HPLC: 7.8 %
HCT VFR BLD CALC: 42.2 %
HDLC SERPL-MCNC: 51 MG/DL
HGB BLD-MCNC: 13.2 G/DL
IMM GRANULOCYTES NFR BLD AUTO: 0.6 %
LDLC SERPL CALC-MCNC: 205 MG/DL
LYMPHOCYTES # BLD AUTO: 1.95 K/UL
LYMPHOCYTES NFR BLD AUTO: 27.7 %
MAN DIFF?: NORMAL
MCHC RBC-ENTMCNC: 27.9 PG
MCHC RBC-ENTMCNC: 31.3 GM/DL
MCV RBC AUTO: 89.2 FL
MONOCYTES # BLD AUTO: 0.71 K/UL
MONOCYTES NFR BLD AUTO: 10.1 %
NEUTROPHILS # BLD AUTO: 4.06 K/UL
NEUTROPHILS NFR BLD AUTO: 57.7 %
NONHDLC SERPL-MCNC: 235 MG/DL
PLATELET # BLD AUTO: 345 K/UL
POTASSIUM SERPL-SCNC: 4.5 MMOL/L
PROT SERPL-MCNC: 7 G/DL
RBC # BLD: 4.73 M/UL
RBC # FLD: 13.1 %
SODIUM SERPL-SCNC: 136 MMOL/L
TRIGL SERPL-MCNC: 163 MG/DL
TSH SERPL-ACNC: 2.3 UIU/ML
WBC # FLD AUTO: 7.03 K/UL

## 2024-09-10 RX ORDER — METFORMIN HYDROCHLORIDE 1000 MG/1
1000 TABLET, COATED ORAL TWICE DAILY
Qty: 180 | Refills: 1 | Status: ACTIVE | COMMUNITY
Start: 2024-09-10 | End: 1900-01-01

## 2024-09-10 RX ORDER — ROSUVASTATIN CALCIUM 20 MG/1
20 TABLET, FILM COATED ORAL DAILY
Qty: 90 | Refills: 1 | Status: ACTIVE | COMMUNITY
Start: 2024-09-10 | End: 1900-01-01

## 2024-09-11 ENCOUNTER — TRANSCRIPTION ENCOUNTER (OUTPATIENT)
Age: 54
End: 2024-09-11

## 2024-09-12 ENCOUNTER — TRANSCRIPTION ENCOUNTER (OUTPATIENT)
Age: 54
End: 2024-09-12

## 2024-09-16 ENCOUNTER — TRANSCRIPTION ENCOUNTER (OUTPATIENT)
Age: 54
End: 2024-09-16

## 2024-09-24 ENCOUNTER — NON-APPOINTMENT (OUTPATIENT)
Age: 54
End: 2024-09-24

## 2024-09-24 ENCOUNTER — APPOINTMENT (OUTPATIENT)
Dept: OPHTHALMOLOGY | Facility: CLINIC | Age: 54
End: 2024-09-24
Payer: MEDICAID

## 2024-09-24 PROCEDURE — 92083 EXTENDED VISUAL FIELD XM: CPT

## 2024-09-24 PROCEDURE — 92014 COMPRE OPH EXAM EST PT 1/>: CPT

## 2024-09-24 PROCEDURE — 92133 CPTRZD OPH DX IMG PST SGM ON: CPT

## 2024-11-19 ENCOUNTER — RX RENEWAL (OUTPATIENT)
Age: 54
End: 2024-11-19

## 2024-12-02 ENCOUNTER — APPOINTMENT (OUTPATIENT)
Dept: INTERNAL MEDICINE | Facility: CLINIC | Age: 54
End: 2024-12-02
Payer: MEDICAID

## 2024-12-02 VITALS
WEIGHT: 159 LBS | BODY MASS INDEX: 31.22 KG/M2 | DIASTOLIC BLOOD PRESSURE: 80 MMHG | TEMPERATURE: 97.2 F | HEIGHT: 60 IN | HEART RATE: 77 BPM | SYSTOLIC BLOOD PRESSURE: 132 MMHG | OXYGEN SATURATION: 97 %

## 2024-12-02 DIAGNOSIS — Z00.00 ENCOUNTER FOR GENERAL ADULT MEDICAL EXAMINATION W/OUT ABNORMAL FINDINGS: ICD-10-CM

## 2024-12-02 DIAGNOSIS — E78.5 HYPERLIPIDEMIA, UNSPECIFIED: ICD-10-CM

## 2024-12-02 DIAGNOSIS — E11.9 TYPE 2 DIABETES MELLITUS W/OUT COMPLICATIONS: ICD-10-CM

## 2024-12-02 PROCEDURE — 99213 OFFICE O/P EST LOW 20 MIN: CPT

## 2024-12-02 PROCEDURE — 36415 COLL VENOUS BLD VENIPUNCTURE: CPT

## 2024-12-02 PROCEDURE — G2211 COMPLEX E/M VISIT ADD ON: CPT | Mod: NC

## 2024-12-03 ENCOUNTER — TRANSCRIPTION ENCOUNTER (OUTPATIENT)
Age: 54
End: 2024-12-03

## 2024-12-03 LAB
ALBUMIN SERPL ELPH-MCNC: 4.5 G/DL
ALP BLD-CCNC: 88 U/L
ALT SERPL-CCNC: 35 U/L
ANION GAP SERPL CALC-SCNC: 12 MMOL/L
AST SERPL-CCNC: 22 U/L
BILIRUB SERPL-MCNC: 0.3 MG/DL
BUN SERPL-MCNC: 15 MG/DL
CALCIUM SERPL-MCNC: 9.5 MG/DL
CHLORIDE SERPL-SCNC: 98 MMOL/L
CHOLEST SERPL-MCNC: 143 MG/DL
CO2 SERPL-SCNC: 24 MMOL/L
CREAT SERPL-MCNC: 0.72 MG/DL
EGFR: 99 ML/MIN/1.73M2
ESTIMATED AVERAGE GLUCOSE: 171 MG/DL
GLUCOSE SERPL-MCNC: 229 MG/DL
HBA1C MFR BLD HPLC: 7.6 %
HDLC SERPL-MCNC: 49 MG/DL
LDLC SERPL CALC-MCNC: 69 MG/DL
NONHDLC SERPL-MCNC: 94 MG/DL
POTASSIUM SERPL-SCNC: 4.6 MMOL/L
PROT SERPL-MCNC: 7.1 G/DL
SODIUM SERPL-SCNC: 134 MMOL/L
TRIGL SERPL-MCNC: 147 MG/DL

## 2024-12-04 ENCOUNTER — TRANSCRIPTION ENCOUNTER (OUTPATIENT)
Age: 54
End: 2024-12-04

## 2024-12-09 ENCOUNTER — TRANSCRIPTION ENCOUNTER (OUTPATIENT)
Age: 54
End: 2024-12-09

## 2025-01-27 ENCOUNTER — TRANSCRIPTION ENCOUNTER (OUTPATIENT)
Age: 55
End: 2025-01-27

## 2025-02-13 NOTE — H&P PST ADULT - PROBLEM SELECTOR PROBLEM 2
No answer, left message on patient voicemail to call the office back.   Patient has an OV today with Dr. Dreyer.    DM2 (diabetes mellitus, type 2)

## 2025-02-25 NOTE — ED ADULT TRIAGE NOTE - AS TEMP SITE
"Goal Outcome Evaluation:       Pt. Alert, oriented to self. Forgot he was in the hospital, states mornings for a while after awakening he is \"a bit messed up\" in regards to orientation. Pleasant, easily reoriented, cooperative. VSS with slightly elevated b/p at 152/65. HR regular, lungs clear.  Denies pain, nausea, dyspnea, or dizziness. Voided at the end of the shift, 450 ml clear dark bambi urine.                  " oral

## 2025-03-24 ENCOUNTER — APPOINTMENT (OUTPATIENT)
Dept: OPHTHALMOLOGY | Facility: CLINIC | Age: 55
End: 2025-03-24

## 2025-04-16 ENCOUNTER — NON-APPOINTMENT (OUTPATIENT)
Age: 55
End: 2025-04-16

## 2025-04-21 ENCOUNTER — APPOINTMENT (OUTPATIENT)
Dept: OBGYN | Facility: CLINIC | Age: 55
End: 2025-04-21
Payer: COMMERCIAL

## 2025-04-21 VITALS
HEIGHT: 60 IN | DIASTOLIC BLOOD PRESSURE: 72 MMHG | HEART RATE: 71 BPM | SYSTOLIC BLOOD PRESSURE: 132 MMHG | TEMPERATURE: 97.1 F | BODY MASS INDEX: 29.45 KG/M2 | OXYGEN SATURATION: 98 % | WEIGHT: 150 LBS

## 2025-04-21 DIAGNOSIS — N95.2 POSTMENOPAUSAL ATROPHIC VAGINITIS: ICD-10-CM

## 2025-04-21 PROCEDURE — 99396 PREV VISIT EST AGE 40-64: CPT

## 2025-04-21 PROCEDURE — 99459 PELVIC EXAMINATION: CPT

## 2025-04-22 LAB — HPV HIGH+LOW RISK DNA PNL CVX: NOT DETECTED

## 2025-04-23 LAB
C TRACH RRNA SPEC QL NAA+PROBE: NOT DETECTED
CANDIDA VAG CYTO: NOT DETECTED
G VAGINALIS+PREV SP MTYP VAG QL MICRO: NOT DETECTED
N GONORRHOEA RRNA SPEC QL NAA+PROBE: NOT DETECTED
SOURCE AMPLIFICATION: NORMAL
T VAGINALIS VAG QL WET PREP: NOT DETECTED

## 2025-04-29 LAB — CYTOLOGY CVX/VAG DOC THIN PREP: ABNORMAL

## 2025-05-13 ENCOUNTER — TRANSCRIPTION ENCOUNTER (OUTPATIENT)
Age: 55
End: 2025-05-13

## 2025-05-29 ENCOUNTER — TRANSCRIPTION ENCOUNTER (OUTPATIENT)
Age: 55
End: 2025-05-29

## 2025-06-04 ENCOUNTER — NON-APPOINTMENT (OUTPATIENT)
Age: 55
End: 2025-06-04

## 2025-06-09 ENCOUNTER — APPOINTMENT (OUTPATIENT)
Dept: INTERNAL MEDICINE | Facility: CLINIC | Age: 55
End: 2025-06-09
Payer: COMMERCIAL

## 2025-06-09 VITALS
OXYGEN SATURATION: 98 % | HEIGHT: 60 IN | HEART RATE: 82 BPM | TEMPERATURE: 98.3 F | WEIGHT: 152 LBS | BODY MASS INDEX: 29.84 KG/M2 | DIASTOLIC BLOOD PRESSURE: 79 MMHG | SYSTOLIC BLOOD PRESSURE: 146 MMHG

## 2025-06-09 VITALS — DIASTOLIC BLOOD PRESSURE: 78 MMHG | SYSTOLIC BLOOD PRESSURE: 125 MMHG

## 2025-06-09 PROCEDURE — 99396 PREV VISIT EST AGE 40-64: CPT | Mod: 25

## 2025-06-09 PROCEDURE — G0009: CPT

## 2025-06-09 PROCEDURE — 90677 PCV20 VACCINE IM: CPT

## 2025-06-09 RX ORDER — SODIUM SULFATE, MAGNESIUM SULFATE, AND POTASSIUM CHLORIDE 17.75; 2.7; 2.25 G/1; G/1; G/1
1479-225-188 TABLET ORAL
Qty: 24 | Refills: 0 | Status: ACTIVE | COMMUNITY
Start: 2025-04-18

## 2025-06-09 RX ORDER — FLUOXETINE HYDROCHLORIDE 40 MG/1
40 CAPSULE ORAL DAILY
Qty: 180 | Refills: 3 | Status: ACTIVE | COMMUNITY
Start: 2025-06-09 | End: 1900-01-01

## 2025-06-11 ENCOUNTER — TRANSCRIPTION ENCOUNTER (OUTPATIENT)
Age: 55
End: 2025-06-11

## 2025-06-11 LAB
25(OH)D3 SERPL-MCNC: 36.4 NG/ML
ALBUMIN SERPL ELPH-MCNC: 4.6 G/DL
ALP BLD-CCNC: 78 U/L
ALT SERPL-CCNC: 33 U/L
ANION GAP SERPL CALC-SCNC: 14 MMOL/L
AST SERPL-CCNC: 22 U/L
BASOPHILS # BLD AUTO: 0.06 K/UL
BASOPHILS NFR BLD AUTO: 0.8 %
BILIRUB SERPL-MCNC: 0.2 MG/DL
BUN SERPL-MCNC: 11 MG/DL
CALCIUM SERPL-MCNC: 9.9 MG/DL
CHLORIDE SERPL-SCNC: 98 MMOL/L
CHOLEST SERPL-MCNC: 307 MG/DL
CO2 SERPL-SCNC: 24 MMOL/L
CREAT SERPL-MCNC: 0.71 MG/DL
CREAT SPEC-SCNC: 44 MG/DL
EGFRCR SERPLBLD CKD-EPI 2021: 100 ML/MIN/1.73M2
EOSINOPHIL # BLD AUTO: 0.28 K/UL
EOSINOPHIL NFR BLD AUTO: 3.6 %
ESTIMATED AVERAGE GLUCOSE: 166 MG/DL
GLUCOSE SERPL-MCNC: 171 MG/DL
HBA1C MFR BLD HPLC: 7.4 %
HCT VFR BLD CALC: 42.2 %
HDLC SERPL-MCNC: 53 MG/DL
HGB BLD-MCNC: 13.4 G/DL
IMM GRANULOCYTES NFR BLD AUTO: 0.5 %
LDLC SERPL-MCNC: 212 MG/DL
LYMPHOCYTES # BLD AUTO: 2.31 K/UL
LYMPHOCYTES NFR BLD AUTO: 29.7 %
MAN DIFF?: NORMAL
MCHC RBC-ENTMCNC: 27.9 PG
MCHC RBC-ENTMCNC: 31.8 G/DL
MCV RBC AUTO: 87.9 FL
MICROALBUMIN 24H UR DL<=1MG/L-MCNC: <1.2 MG/DL
MICROALBUMIN/CREAT 24H UR-RTO: NORMAL MG/G
MONOCYTES # BLD AUTO: 0.6 K/UL
MONOCYTES NFR BLD AUTO: 7.7 %
NEUTROPHILS # BLD AUTO: 4.5 K/UL
NEUTROPHILS NFR BLD AUTO: 57.7 %
NONHDLC SERPL-MCNC: 254 MG/DL
PLATELET # BLD AUTO: 368 K/UL
POTASSIUM SERPL-SCNC: 4.3 MMOL/L
PROT SERPL-MCNC: 7.2 G/DL
RBC # BLD: 4.8 M/UL
RBC # FLD: 13.4 %
SODIUM SERPL-SCNC: 136 MMOL/L
TRIGL SERPL-MCNC: 216 MG/DL
TSH SERPL-ACNC: 1.75 UIU/ML
WBC # FLD AUTO: 7.79 K/UL

## 2025-06-21 ENCOUNTER — OUTPATIENT (OUTPATIENT)
Dept: OUTPATIENT SERVICES | Facility: HOSPITAL | Age: 55
LOS: 1 days | End: 2025-06-21
Payer: COMMERCIAL

## 2025-06-21 ENCOUNTER — RESULT REVIEW (OUTPATIENT)
Age: 55
End: 2025-06-21

## 2025-06-21 ENCOUNTER — APPOINTMENT (OUTPATIENT)
Dept: ULTRASOUND IMAGING | Facility: CLINIC | Age: 55
End: 2025-06-21
Payer: COMMERCIAL

## 2025-06-21 ENCOUNTER — APPOINTMENT (OUTPATIENT)
Dept: MAMMOGRAPHY | Facility: CLINIC | Age: 55
End: 2025-06-21
Payer: COMMERCIAL

## 2025-06-21 DIAGNOSIS — Z90.89 ACQUIRED ABSENCE OF OTHER ORGANS: Chronic | ICD-10-CM

## 2025-06-21 DIAGNOSIS — Z12.39 ENCOUNTER FOR OTHER SCREENING FOR MALIGNANT NEOPLASM OF BREAST: ICD-10-CM

## 2025-06-21 DIAGNOSIS — R92.30 DENSE BREASTS, UNSPECIFIED: ICD-10-CM

## 2025-06-21 DIAGNOSIS — Z98.890 OTHER SPECIFIED POSTPROCEDURAL STATES: Chronic | ICD-10-CM

## 2025-06-21 PROCEDURE — 76641 ULTRASOUND BREAST COMPLETE: CPT | Mod: 26,50

## 2025-06-21 PROCEDURE — 77063 BREAST TOMOSYNTHESIS BI: CPT

## 2025-06-21 PROCEDURE — 76641 ULTRASOUND BREAST COMPLETE: CPT

## 2025-06-21 PROCEDURE — 77063 BREAST TOMOSYNTHESIS BI: CPT | Mod: 26

## 2025-06-21 PROCEDURE — 77067 SCR MAMMO BI INCL CAD: CPT | Mod: 26

## 2025-06-21 PROCEDURE — 77067 SCR MAMMO BI INCL CAD: CPT

## 2025-06-24 ENCOUNTER — TRANSCRIPTION ENCOUNTER (OUTPATIENT)
Age: 55
End: 2025-06-24

## 2025-06-25 ENCOUNTER — TRANSCRIPTION ENCOUNTER (OUTPATIENT)
Age: 55
End: 2025-06-25

## 2025-06-27 ENCOUNTER — TRANSCRIPTION ENCOUNTER (OUTPATIENT)
Age: 55
End: 2025-06-27

## 2025-06-27 RX ORDER — ATORVASTATIN CALCIUM 40 MG/1
40 TABLET, FILM COATED ORAL
Qty: 1 | Refills: 3 | Status: ACTIVE | COMMUNITY
Start: 2025-06-27 | End: 1900-01-01

## 2025-06-30 ENCOUNTER — APPOINTMENT (OUTPATIENT)
Dept: OPHTHALMOLOGY | Facility: CLINIC | Age: 55
End: 2025-06-30
Payer: COMMERCIAL

## 2025-06-30 ENCOUNTER — NON-APPOINTMENT (OUTPATIENT)
Age: 55
End: 2025-06-30

## 2025-06-30 PROCEDURE — 92012 INTRM OPH EXAM EST PATIENT: CPT

## 2025-06-30 PROCEDURE — 92133 CPTRZD OPH DX IMG PST SGM ON: CPT

## 2025-07-11 ENCOUNTER — TRANSCRIPTION ENCOUNTER (OUTPATIENT)
Age: 55
End: 2025-07-11

## 2025-07-11 NOTE — H&P PST ADULT - FUNCTIONAL ASSESSMENT - DAILY ACTIVITY PT AGE POP HIDDEN
Discharge instructions reviewed with patient and visitor. Handouts given & verbalized understanding with no further questions at this time.  spoke to pt at bedside, reviewed procedure and findings, answered questions. Made aware they are awaiting biopsy results with MD telephone number provided per AVS sheet. VSS on RA, no pain or nausea noted, tolerating po fluids, no complaints noted. Fall precautions reviewed, consents in chart, PIV removed at this time.   
Adult

## 2025-09-02 ENCOUNTER — APPOINTMENT (OUTPATIENT)
Dept: INTERNAL MEDICINE | Facility: CLINIC | Age: 55
End: 2025-09-02
Payer: COMMERCIAL

## 2025-09-02 VITALS
DIASTOLIC BLOOD PRESSURE: 77 MMHG | OXYGEN SATURATION: 98 % | WEIGHT: 142 LBS | SYSTOLIC BLOOD PRESSURE: 149 MMHG | HEIGHT: 60 IN | BODY MASS INDEX: 27.88 KG/M2 | TEMPERATURE: 97.6 F | HEART RATE: 84 BPM

## 2025-09-02 VITALS — DIASTOLIC BLOOD PRESSURE: 77 MMHG | SYSTOLIC BLOOD PRESSURE: 133 MMHG

## 2025-09-02 DIAGNOSIS — E11.9 TYPE 2 DIABETES MELLITUS W/OUT COMPLICATIONS: ICD-10-CM

## 2025-09-02 DIAGNOSIS — E78.5 HYPERLIPIDEMIA, UNSPECIFIED: ICD-10-CM

## 2025-09-02 DIAGNOSIS — Z00.00 ENCOUNTER FOR GENERAL ADULT MEDICAL EXAMINATION W/OUT ABNORMAL FINDINGS: ICD-10-CM

## 2025-09-02 PROCEDURE — G2211 COMPLEX E/M VISIT ADD ON: CPT

## 2025-09-02 PROCEDURE — 99214 OFFICE O/P EST MOD 30 MIN: CPT

## 2025-09-03 ENCOUNTER — TRANSCRIPTION ENCOUNTER (OUTPATIENT)
Age: 55
End: 2025-09-03

## 2025-09-03 LAB
ALBUMIN SERPL ELPH-MCNC: 4.7 G/DL
ALP BLD-CCNC: 71 U/L
ALT SERPL-CCNC: 25 U/L
ANION GAP SERPL CALC-SCNC: 15 MMOL/L
APO A-I SERPL-MCNC: 160 MG/DL
APO A-I/APO B SERPL: 0.69
APO B SERPL-MCNC: 111 MG/DL
AST SERPL-CCNC: 22 U/L
BILIRUB SERPL-MCNC: 0.2 MG/DL
BUN SERPL-MCNC: 12 MG/DL
CALCIUM SERPL-MCNC: 9.6 MG/DL
CHLORIDE SERPL-SCNC: 99 MMOL/L
CHOLEST SERPL-MCNC: 256 MG/DL
CO2 SERPL-SCNC: 23 MMOL/L
CREAT SERPL-MCNC: 0.64 MG/DL
CRP SERPL HS-MCNC: 2.22 MG/L
EGFRCR SERPLBLD CKD-EPI 2021: 104 ML/MIN/1.73M2
ESTIMATED AVERAGE GLUCOSE: 148 MG/DL
GLUCOSE SERPL-MCNC: 151 MG/DL
HBA1C MFR BLD HPLC: 6.8 %
HDLC SERPL-MCNC: 51 MG/DL
LDLC SERPL-MCNC: 170 MG/DL
NONHDLC SERPL-MCNC: 206 MG/DL
POTASSIUM SERPL-SCNC: 4.4 MMOL/L
PROT SERPL-MCNC: 7 G/DL
SODIUM SERPL-SCNC: 136 MMOL/L
TRIGL SERPL-MCNC: 192 MG/DL

## (undated) DEVICE — ELCTR GROUNDING PAD ADULT COVIDIEN

## (undated) DEVICE — Device

## (undated) DEVICE — SUT VLOC 180 0 18" GS-21 GREEN

## (undated) DEVICE — TUBING STRYKEFLOW II SUCTION / IRRIGATOR

## (undated) DEVICE — TUBING OLYMPUS INSUFFLATION

## (undated) DEVICE — POSITIONER PINK PAD PIGAZZI SYSTEM

## (undated) DEVICE — UTERINE MANIPULATOR CONMED VCARE SM 32MM

## (undated) DEVICE — SUT VICRYL 0 27" UR-6

## (undated) DEVICE — DRAPE LAVH 124X30X125"

## (undated) DEVICE — FOLEY TRAY 16FR 5CC LF UMETER CLOSED

## (undated) DEVICE — LIGASURE MARYLAND JAW LAPAROSCOPIC SEALER 5MM-44CM

## (undated) DEVICE — D HELP - CLEARVIEW CLEARIFY SYSTEM

## (undated) DEVICE — ENDOCATCH 10MM SPECIMEN POUCH

## (undated) DEVICE — TROCAR COVIDIEN VERSAONE BLADELESS FIXATION 5MM STD

## (undated) DEVICE — OLYMPUS PK SPATULA 5MM

## (undated) DEVICE — PROTECTOR HEEL / ELBOW FLUFFY

## (undated) DEVICE — SUT MONOCRYL 4-0 27" PS-2 UNDYED

## (undated) DEVICE — SUT VICRYL 0 18" CT-1 UNDYED

## (undated) DEVICE — ENDOCATCH II 15MM

## (undated) DEVICE — SUT VICRYL 0 18" CT-1

## (undated) DEVICE — STAPLER SKIN VISI-STAT 35 WIDE

## (undated) DEVICE — LIGASURE BLUNT TIP NANO CTD 37CM

## (undated) DEVICE — PACK GENERAL MINOR

## (undated) DEVICE — TUBING W FILTER STERILE FOR INSUFFLATION

## (undated) DEVICE — BLANKET WARMER UPPER ADULT

## (undated) DEVICE — WRAP COMPRESSION CALF MED

## (undated) DEVICE — DRAPE INSTRUMENT POUCH

## (undated) DEVICE — PACK PERI GYN

## (undated) DEVICE — GELPOINT MINI ADVANCED

## (undated) DEVICE — UTERINE MANIPULATOR CONMED VCARE MED 34MM

## (undated) DEVICE — SPONGE LAP X-RAY DETECTABLE 18X18"

## (undated) DEVICE — APPLICATOR ARISTA FLEXI TIP XL 38CM